# Patient Record
Sex: FEMALE | Race: WHITE | NOT HISPANIC OR LATINO | Employment: FULL TIME | URBAN - METROPOLITAN AREA
[De-identification: names, ages, dates, MRNs, and addresses within clinical notes are randomized per-mention and may not be internally consistent; named-entity substitution may affect disease eponyms.]

---

## 2019-01-05 ENCOUNTER — HOSPITAL ENCOUNTER (OUTPATIENT)
Dept: URGENT CARE | Facility: CLINIC | Age: 29
Discharge: HOME | End: 2019-01-05
Attending: EMERGENCY MEDICINE

## 2019-01-14 ENCOUNTER — HOSPITAL ENCOUNTER (OUTPATIENT)
Dept: URGENT CARE | Facility: CLINIC | Age: 29
Discharge: HOME | End: 2019-01-14
Attending: PHYSICIAN ASSISTANT

## 2019-01-16 LAB
AMOXICILLIN+CLAV SUSC ISLT: <=2
AMPICILLIN SUSC ISLT: 4
AMPICILLIN+SULBAC SUSC ISLT: <=2
BACTERIA UR CULT: ABNORMAL
CEFAZOLIN SUSC ISLT: <=4
CEFEPIME SUSC ISLT: <=1
CEFTAZIDIME SUSC ISLT: <=1
CEFTRIAXONE SUSC ISLT: <=1
CEFUROXIME ORAL SUSC ISLT: 4
CEFUROXIME PARENTER SUSC ISLT: 4
CIPROFLOXACIN SUSC ISLT: <=0.25
ERTAPENEM SUSC ISLT: <=0.5
GENTAMICIN SUSC ISLT: <=1
LEVOFLOXACIN SUSC ISLT: <=0.12
NITROFURANTOIN SUSC ISLT: <=16
TETRACYCLINE SUSC ISLT: >=16
TMP SMX SUSC ISLT: <=20
TOBRAMYCIN SUSC ISLT: <=1

## 2021-11-16 ENCOUNTER — OFFICE VISIT (OUTPATIENT)
Dept: FAMILY MEDICINE CLINIC | Facility: CLINIC | Age: 31
End: 2021-11-16

## 2021-11-16 VITALS
HEART RATE: 116 BPM | TEMPERATURE: 97 F | HEIGHT: 63 IN | SYSTOLIC BLOOD PRESSURE: 122 MMHG | DIASTOLIC BLOOD PRESSURE: 76 MMHG | WEIGHT: 146 LBS | BODY MASS INDEX: 25.87 KG/M2 | OXYGEN SATURATION: 100 %

## 2021-11-16 DIAGNOSIS — Z86.59 HISTORY OF BIPOLAR DISORDER: ICD-10-CM

## 2021-11-16 DIAGNOSIS — F41.9 ANXIETY: Primary | ICD-10-CM

## 2021-11-16 DIAGNOSIS — Z86.59 HISTORY OF OBSESSIVE COMPULSIVE DISORDER: ICD-10-CM

## 2021-11-16 LAB
25(OH)D3 SERPL-MCNC: 34.2 NG/ML
ALBUMIN SERPL-MCNC: 4.3 G/DL (ref 3.5–5.2)
ALBUMIN/GLOB SERPL: 1.5 G/DL
ALP SERPL-CCNC: 71 U/L (ref 39–117)
ALT SERPL W P-5'-P-CCNC: 19 U/L (ref 1–33)
ANION GAP SERPL CALCULATED.3IONS-SCNC: 7.5 MMOL/L (ref 5–15)
AST SERPL-CCNC: 19 U/L (ref 1–32)
BASOPHILS # BLD AUTO: 0.1 10*3/MM3 (ref 0–0.2)
BASOPHILS NFR BLD AUTO: 1.1 % (ref 0–1.5)
BILIRUB SERPL-MCNC: 0.7 MG/DL (ref 0–1.2)
BUN SERPL-MCNC: 17 MG/DL (ref 6–20)
BUN/CREAT SERPL: 22.4 (ref 7–25)
CALCIUM SPEC-SCNC: 8.3 MG/DL (ref 8.6–10.5)
CHLORIDE SERPL-SCNC: 106 MMOL/L (ref 98–107)
CO2 SERPL-SCNC: 27.5 MMOL/L (ref 22–29)
CREAT SERPL-MCNC: 0.76 MG/DL (ref 0.57–1)
DEPRECATED RDW RBC AUTO: 48.3 FL (ref 37–54)
EOSINOPHIL # BLD AUTO: 0.07 10*3/MM3 (ref 0–0.4)
EOSINOPHIL NFR BLD AUTO: 0.8 % (ref 0.3–6.2)
ERYTHROCYTE [DISTWIDTH] IN BLOOD BY AUTOMATED COUNT: 13 % (ref 12.3–15.4)
FERRITIN SERPL-MCNC: 75.4 NG/ML (ref 13–150)
FOLATE SERPL-MCNC: 12.8 NG/ML (ref 4.78–24.2)
GFR SERPL CREATININE-BSD FRML MDRD: 90 ML/MIN/1.73
GLOBULIN UR ELPH-MCNC: 2.8 GM/DL
GLUCOSE SERPL-MCNC: 79 MG/DL (ref 65–99)
HCT VFR BLD AUTO: 43.9 % (ref 34–46.6)
HGB BLD-MCNC: 14.1 G/DL (ref 12–15.9)
IMM GRANULOCYTES # BLD AUTO: 0.13 10*3/MM3 (ref 0–0.05)
IMM GRANULOCYTES NFR BLD AUTO: 1.5 % (ref 0–0.5)
IRON 24H UR-MRATE: 255 MCG/DL (ref 37–145)
IRON SATN MFR SERPL: 55 % (ref 20–50)
LYMPHOCYTES # BLD AUTO: 1.86 10*3/MM3 (ref 0.7–3.1)
LYMPHOCYTES NFR BLD AUTO: 20.9 % (ref 19.6–45.3)
MCH RBC QN AUTO: 32 PG (ref 26.6–33)
MCHC RBC AUTO-ENTMCNC: 32.1 G/DL (ref 31.5–35.7)
MCV RBC AUTO: 99.8 FL (ref 79–97)
MONOCYTES # BLD AUTO: 0.65 10*3/MM3 (ref 0.1–0.9)
MONOCYTES NFR BLD AUTO: 7.3 % (ref 5–12)
NEUTROPHILS NFR BLD AUTO: 6.09 10*3/MM3 (ref 1.7–7)
NEUTROPHILS NFR BLD AUTO: 68.4 % (ref 42.7–76)
NRBC BLD AUTO-RTO: 0 /100 WBC (ref 0–0.2)
PLATELET # BLD AUTO: 308 10*3/MM3 (ref 140–450)
PMV BLD AUTO: 11.5 FL (ref 6–12)
POTASSIUM SERPL-SCNC: 4.7 MMOL/L (ref 3.5–5.2)
PROT SERPL-MCNC: 7.1 G/DL (ref 6–8.5)
RBC # BLD AUTO: 4.4 10*6/MM3 (ref 3.77–5.28)
SODIUM SERPL-SCNC: 141 MMOL/L (ref 136–145)
T4 FREE SERPL-MCNC: 1.21 NG/DL (ref 0.93–1.7)
TIBC SERPL-MCNC: 468 MCG/DL (ref 298–536)
TRANSFERRIN SERPL-MCNC: 314 MG/DL (ref 200–360)
TSH SERPL DL<=0.05 MIU/L-ACNC: 0.46 UIU/ML (ref 0.27–4.2)
VIT B12 BLD-MCNC: 341 PG/ML (ref 211–946)
WBC # BLD AUTO: 8.9 10*3/MM3 (ref 3.4–10.8)

## 2021-11-16 PROCEDURE — 84439 ASSAY OF FREE THYROXINE: CPT | Performed by: NURSE PRACTITIONER

## 2021-11-16 PROCEDURE — 82728 ASSAY OF FERRITIN: CPT | Performed by: NURSE PRACTITIONER

## 2021-11-16 PROCEDURE — 99203 OFFICE O/P NEW LOW 30 MIN: CPT | Performed by: NURSE PRACTITIONER

## 2021-11-16 PROCEDURE — 83540 ASSAY OF IRON: CPT | Performed by: NURSE PRACTITIONER

## 2021-11-16 PROCEDURE — 82306 VITAMIN D 25 HYDROXY: CPT | Performed by: NURSE PRACTITIONER

## 2021-11-16 PROCEDURE — 82607 VITAMIN B-12: CPT | Performed by: NURSE PRACTITIONER

## 2021-11-16 PROCEDURE — 82746 ASSAY OF FOLIC ACID SERUM: CPT | Performed by: NURSE PRACTITIONER

## 2021-11-16 PROCEDURE — 80050 GENERAL HEALTH PANEL: CPT | Performed by: NURSE PRACTITIONER

## 2021-11-16 PROCEDURE — 84466 ASSAY OF TRANSFERRIN: CPT | Performed by: NURSE PRACTITIONER

## 2021-11-16 RX ORDER — MULTIPLE VITAMINS W/ MINERALS TAB 9MG-400MCG
1 TAB ORAL DAILY
COMMUNITY
End: 2022-12-14

## 2021-11-16 RX ORDER — FAMOTIDINE 20 MG/1
TABLET, FILM COATED ORAL
COMMUNITY
Start: 2021-10-21 | End: 2023-03-23

## 2021-11-16 RX ORDER — BUSPIRONE HYDROCHLORIDE 5 MG/1
5 TABLET ORAL 2 TIMES DAILY
Qty: 60 TABLET | Refills: 0 | Status: SHIPPED | OUTPATIENT
Start: 2021-11-16 | End: 2022-05-12

## 2021-11-16 RX ORDER — HYDROXYZINE HYDROCHLORIDE 10 MG/1
10 TABLET, FILM COATED ORAL EVERY 8 HOURS PRN
Qty: 90 TABLET | Refills: 0 | Status: SHIPPED | OUTPATIENT
Start: 2021-11-16 | End: 2021-12-07

## 2021-11-16 RX ORDER — DICYCLOMINE HYDROCHLORIDE 10 MG/1
10 CAPSULE ORAL
COMMUNITY
End: 2022-05-12

## 2021-11-16 NOTE — PROGRESS NOTES
Chief Complaint  Anxiety    Subjective            Mariam Lopes presents to [facility]  History of Present Illness     She is here today with concerns for possible anxiety/depression, post-partum depression. She states that her second child was born in August - a little girl. Her oldest child, also a girl, will be 6 in December. She also has three step-children at home, 7, 10, and 13. She does not know if she had post-partum depression after her first due drug use.     She works full time at [employer], 40 hours per week. She has the five kids at home, the step kids she has had for the past two years, and now their bio mom has taken them back - and she is getting child support now. She is engaged to their father. He is very helpful at home, but he does tell her that her own struggles are that of her own making. She owns that. He doesn't yell at her, and his isn't mean to her, but he is not supportive of her mental health. They have been together for three years. She has managed her symptoms by smoking marijuana.     She has been prescribed Zoloft in the past and that put her in the mental hospital. She does have bipolar d/o and in the past she has been prescribed Lamictal and that is the only thing that really seemed to help her. She can be in a very good mood, and then something can happen, and then she feels like everything is ruined. She has ruined things with her family where she just sits there in silence or instigates things.     In regards to her drug abuse - she did go to rehab - went April 2017 to June 2018 and she has been clean since then - with the exception of marijuana and alcohol on occasion. She used pills - meth, heroine, anything pills or smoking - but not injecting.     In regards to past treatment, she has been prescribed Celexa, Buspar, hydroxyzine, Tinex, at one point.     Denies any HI/SI. No AVH. No thoughts of harming her baby.     PHQ-9 Total Score:  "14    History reviewed. No pertinent past medical history.    Allergies   Allergen Reactions   • Erythromycin Rash   • Sulfa Antibiotics Rash        Past Surgical History:   Procedure Laterality Date   • TEAR DUCT SURGERY          Social History     Tobacco Use   • Smoking status: Current Every Day Smoker     Packs/day: 0.50     Start date: 2005   • Smokeless tobacco: Never Used   Substance Use Topics   • Alcohol use: Not on file   • Drug use: Not on file       Family History   Problem Relation Age of Onset   • Mental illness Mother    • Hypertension Father    • Heart attack Paternal Uncle    • Heart attack Paternal Grandfather         Health Maintenance Due   Topic Date Due   • ANNUAL PHYSICAL  Never done   • Pneumococcal Vaccine 0-64 (1 of 2 - PPSV23) Never done   • COVID-19 Vaccine (1) Never done   • INFLUENZA VACCINE  Never done   • HEPATITIS C SCREENING  Never done   • PAP SMEAR  Never done        Current Outpatient Medications on File Prior to Visit   Medication Sig   • dicyclomine (BENTYL) 10 MG capsule Take 10 mg by mouth 4 (Four) Times a Day Before Meals & at Bedtime.   • famotidine (PEPCID) 20 MG tablet    • multivitamin with minerals tablet tablet Take 1 tablet by mouth Daily.   • norelgestromin-ethinyl estradiol (ORTHO EVRA) 150-35 MCG/24HR Place 1 patch on the skin as directed by provider.     No current facility-administered medications on file prior to visit.         There is no immunization history on file for this patient.    Review of Systems     Objective     /76   Pulse 116   Temp 97 °F (36.1 °C)   Ht 160 cm (63\")   Wt 66.2 kg (146 lb)   SpO2 100%   BMI 25.86 kg/m²       Physical Exam  Vitals reviewed.   Constitutional:       General: She is not in acute distress.     Appearance: Normal appearance. She is well-developed.   HENT:      Head: Normocephalic and atraumatic.      Right Ear: Tympanic membrane, ear canal and external ear normal. There is no impacted cerumen.      Left Ear: " Tympanic membrane, ear canal and external ear normal. There is no impacted cerumen.      Nose: Nose normal.      Mouth/Throat:      Mouth: Mucous membranes are moist.      Pharynx: Oropharynx is clear. No oropharyngeal exudate or posterior oropharyngeal erythema.   Eyes:      General: No scleral icterus.     Conjunctiva/sclera: Conjunctivae normal.   Neck:      Thyroid: No thyroid mass, thyromegaly or thyroid tenderness.      Trachea: Trachea normal.   Cardiovascular:      Rate and Rhythm: Normal rate and regular rhythm.      Pulses: Normal pulses.      Heart sounds: No murmur heard.      Pulmonary:      Effort: Pulmonary effort is normal.      Breath sounds: Normal breath sounds. No wheezing or rhonchi.   Musculoskeletal:         General: Normal range of motion.      Cervical back: Normal range of motion and neck supple.      Right lower leg: No edema.      Left lower leg: No edema.   Lymphadenopathy:      Cervical: No cervical adenopathy.   Skin:     General: Skin is warm and dry.   Neurological:      Mental Status: She is alert and oriented to person, place, and time.   Psychiatric:         Mood and Affect: Mood and affect normal.         Behavior: Behavior normal.         Thought Content: Thought content normal.         Judgment: Judgment normal.         Result Review :                       Assessment and Plan      Diagnoses and all orders for this visit:    1. Anxiety (Primary)  -     Ambulatory Referral to Psychiatry  -     CBC Auto Differential  -     Comprehensive Metabolic Panel  -     TSH+Free T4  -     Iron Profile  -     Ferritin  -     Vitamin B12 & Folate  -     Vitamin D 25 Hydroxy  -     busPIRone (BUSPAR) 5 MG tablet; Take 1 tablet by mouth 2 (Two) Times a Day.  Dispense: 60 tablet; Refill: 0  -     hydrOXYzine (ATARAX) 10 MG tablet; Take 1 tablet by mouth Every 8 (Eight) Hours As Needed for Anxiety.  Dispense: 90 tablet; Refill: 0    2. Postpartum depression  -     Ambulatory Referral to  Psychiatry  -     CBC Auto Differential  -     Comprehensive Metabolic Panel  -     TSH+Free T4  -     Iron Profile  -     Ferritin  -     Vitamin B12 & Folate  -     Vitamin D 25 Hydroxy    3. History of bipolar disorder  -     Ambulatory Referral to Psychiatry    4. History of obsessive compulsive disorder  -     Ambulatory Referral to Psychiatry            Follow Up     Return in about 3 weeks (around 12/7/2021) for recheck, sooner if needed.    Patient was given instructions and counseling regarding her condition or for health maintenance advice. Please see specific information pulled into the AVS if appropriate.

## 2021-11-16 NOTE — PROGRESS NOTES
Venipuncture Blood Specimen Collection  Venipuncture performed in left arm  by Tatiana Arauz with good hemostasis. Patient tolerated the procedure well without complications.   11/16/21   Tatiana Arauz

## 2021-11-19 ENCOUNTER — PATIENT ROUNDING (BHMG ONLY) (OUTPATIENT)
Dept: FAMILY MEDICINE CLINIC | Facility: CLINIC | Age: 31
End: 2021-11-19

## 2021-11-19 NOTE — PROGRESS NOTES
November 19, 2021    Hello, may I speak with Mariam Lopes?    My name is Dariela Rodrigez      I am  with [facility]  [address] [place] [address] [address] [place]NG [id number] [phone].    Before we get started may I verify your date of birth? 1990    I am calling to officially welcome you to our practice and ask about your recent visit. Is this a good time to talk? yes    Tell me about your visit with us. What things went well?  everything went smoothly       We're always looking for ways to make our patients' experiences even better. Do you have recommendations on ways we may improve?  no    Overall were you satisfied with your first visit to our practice? yes       I appreciate you taking the time to speak with me today. Is there anything else I can do for you? no      Thank you, and have a great day.

## 2021-12-02 NOTE — PROGRESS NOTES
"    Chief Complaint:  Agitation    History of Present Illness: Mariam Lopes is a 31 y.o. female who presents to the office today referred by PCP Devorah ARCHIBALD.  Patient complains of worsening agitation and anxiety after having her daughter who is now 3 months old.  Patient reports having many life changes and increase in stress.  Her stepchildren are no longer living with her and she recently switched from third shift to first shift at work.  Patient will typically sleep about 6 to 7 hours a night, although states she will wake up throughout the night to check on her baby to make sure she is breathing.  Patient reports being diagnosed with bipolar disorder in the past.  Patient states her last manic episode was about 3 weeks after having her daughter and experienced increased agitation, crying due to feeling overwhelmed with her kitchen being unorganized.  Patient states she has constant flight of ideas.  She has been smoking marijuana to treat her symptoms and states it will slow her mind down.  Patient denies having any current SI and HI.  She has had intrusive suicidal thoughts such as having a car crash while driving.  Patient states this is distressing to her and that she does not actually want to hurt her self and then she feels guilty.  Patient denies having any plan.  No access to firearms.  She denies history of suicide attempt.  History of self-harm with cutting her arms and thighs, which she lasted in the seventh/eighth grade.  Patient does admit to hitting herself out of frustration at times.  She will have increased anxiety with needing to constantly clean and organized and have things done correctly.  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  Patient reports increased anxiety is due to worrying if a  would plant drugs on " her and then her losing her children and everything else.  Patient denies AVH. Pt denies having any thoughts of wanting to harm her baby in any way. She denies any possibility of currently being pregnant and is on birth control patches. Pt is not currently breastfeeding.    Medical Record Review: Reviewed office visit ntoe from 11/16/21, pt seen for anxiety/depression, postpartum depression. She has managed her symptoms by smoking marijuana. S/p Zoloft in the past and that put her in the mental hospital. She does have bipolar disorder and in the past she has been prescribed Lamictal and that is the only thing that really seemed to help her.  S/p celexa, buspar, hydroxyzine. PHQ-9 score of 14 at visit. Pt denies having any thoughts of harming her baby.     Reviewed recent lab results from 11/16/21, CBC WNL (except MCV 99.8, immature grans 1.5%, abs immature grans 0.13), CMP WNL (except calcium 8.3), TSH and FT4 WNL      PHQ-9 Depression Screening  Little interest or pleasure in doing things? 1   Feeling down, depressed, or hopeless? 2   Trouble falling or staying asleep, or sleeping too much? 3   Feeling tired or having little energy? 3   Poor appetite or overeating? 0   Feeling bad about yourself - or that you are a failure or have let yourself or your family down? 3   Trouble concentrating on things, such as reading the newspaper or watching television? 3   Moving or speaking so slowly that other people could have noticed? Or the opposite - being so fidgety or restless that you have been moving around a lot more than usual? 1   Thoughts that you would be better off dead, or of hurting yourself in some way? 1   PHQ-9 Total Score 17   If you checked off any problems, how difficult have these problems made it for you to do your work, take care of things at home, or get along with other people? Very difficult       OMAIRA-7  Feeling nervous, anxious or on edge: Nearly every day  Not being able to stop or control worrying:  Nearly every day  Worrying too much about different things: Nearly every day  Trouble Relaxing: Nearly every day  Being so restless that it is hard to sit still: Not at all  Feeling afraid as if something awful might happen: Nearly every day  Becoming easily annoyed or irritable: Nearly every day  OMAIRA 7 Total Score: 18  If you checked any problems, how difficult have these problems made it for you to do your work, take care of things at home, or get along with other people: Very difficult      ROS:  Review of Systems   Constitutional: Positive for unexpected weight change. Negative for appetite change, diaphoresis and fatigue.   HENT: Negative for drooling, tinnitus and trouble swallowing.    Eyes: Negative for visual disturbance.   Respiratory: Negative for cough, chest tightness and shortness of breath.    Cardiovascular: Negative for chest pain and palpitations.   Gastrointestinal: Positive for diarrhea. Negative for abdominal pain, constipation, nausea and vomiting.   Endocrine: Negative for cold intolerance and heat intolerance.   Genitourinary: Negative for difficulty urinating.   Musculoskeletal: Negative for arthralgias and myalgias.   Skin: Negative for rash.   Allergic/Immunologic: Negative for immunocompromised state.   Neurological: Positive for headaches. Negative for dizziness, tremors and seizures.   Psychiatric/Behavioral: Positive for agitation, dysphoric mood and suicidal ideas. Negative for hallucinations, self-injury and sleep disturbance. The patient is nervous/anxious.        Problem List:  Patient Active Problem List   Diagnosis   • Cervical cancer screening   • Complication of pregnancy, childbirth and puerperium   • Gastroesophageal reflux disease   • Nausea   • Normal pregnancy in multigravida   • RhD negative   • Type A blood, Rh negative   • Underimmunization status       Current Medications:   Current Outpatient Medications   Medication Sig Dispense Refill   • dicyclomine (BENTYL) 10 MG  "capsule Take 10 mg by mouth 4 (Four) Times a Day Before Meals & at Bedtime.     • famotidine (PEPCID) 20 MG tablet      • multivitamin with minerals tablet tablet Take 1 tablet by mouth Daily.     • norelgestromin-ethinyl estradiol (ORTHO EVRA) 150-35 MCG/24HR Place 1 patch on the skin as directed by provider.     • busPIRone (BUSPAR) 5 MG tablet Take 1 tablet by mouth 2 (Two) Times a Day. 60 tablet 0   • hydrOXYzine (ATARAX) 25 MG tablet Take 1 tablet by mouth 3 (Three) Times a Day As Needed for Anxiety for up to 30 days. 90 tablet 1   • lamoTRIgine (LaMICtal) 25 MG tablet Take 1 tab PO QD x 2 weeks, if tolerated can take 2 tab PO QD 46 tablet 1     No current facility-administered medications for this visit.       Discontinued Medications:  Medications Discontinued During This Encounter   Medication Reason   • hydrOXYzine (ATARAX) 10 MG tablet        Allergy:   Allergies   Allergen Reactions   • Erythromycin Rash   • Sulfa Antibiotics Rash        Past Medical History:  Past Medical History:   Diagnosis Date   • Anxiety    • Depression    • Obsessive-compulsive disorder    • Panic disorder    • Self-injurious behavior    • Substance abuse (HCC)    • Withdrawal symptoms, drug or narcotic (HCC)        Past Surgical History:  Past Surgical History:   Procedure Laterality Date   • COLONOSCOPY     • D & C AND LAPAROSCOPY     • ENDOSCOPY     • TEAR DUCT SURGERY     • VAGINAL BIRTH AFTER  SECTION         Past Psychiatric History:  Began Treatment: Patient has been in therapy since seventh grade due to outbursts.   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   Therapist: Pt did therapy from the age in 7th grade until she was 18 years old.   Admission History: Patient was admitted when she was in high school to [facility] due to SI and HI, and increased agitation after being " started on Zoloft.  Medications/Treatment: Patient has been on Zoloft (SI, HI, increased agitation), Celexa, BuSpar, Seroquel (weight gain), guanfacine. Pt reports being on Lamictal in the past and worked well with controlling her symptoms.   Self Harm: History of self-harm with cutting her arms and thighs, which she lasted in the seventh/eighth grade.  Patient does admit to hitting herself out of frustration at times.   Suicide Attempts: Denies    Family Psychiatric History:   Diagnoses: Her mother has history of bipolar disorder, anxiety, depression, OCD.  Her father has history of OCD.  Her sister has a history of anxiety, depression, OCD.  Her brother has a history of OCD.  Substance use: Her mother has a history of drug abuse, father has history of EtOH abuse.  Suicide Attempts/Completions: Her mother attempted suicide multiple times.  Patient denies any family history of suicide completions.    Family History   Problem Relation Age of Onset   • Mental illness Mother    • Anxiety disorder Mother    • Bipolar disorder Mother    • Depression Mother    • Drug abuse Mother    • OCD Mother    • Self-Injurious Behavior  Mother    • Suicide Attempts Mother    • Hypertension Father    • Alcohol abuse Father    • OCD Father    • Heart attack Paternal Uncle    • Heart attack Paternal Grandfather    • Anxiety disorder Sister    • Depression Sister    • OCD Sister    • OCD Brother    • Anxiety disorder Maternal Uncle    • Bipolar disorder Maternal Uncle    • Depression Maternal Uncle    • Drug abuse Maternal Uncle    • Anxiety disorder Maternal Grandmother    • Bipolar disorder Maternal Grandmother    • Depression Maternal Grandmother    • Drug abuse Cousin        Substance Abuse History:   Alcohol use: Occasional  Nicotine: Former  Illicit Drug Use: Patient reports history of methamphetamine use (smoking).  Last meth use was in 2017.  Patient admits to smoking marijuana.  Longest Period Sober: Patient has been sober from  methamphetamine and other drug use, except for marijuana, since 2017.  Rehab/AA/NA: Patient reports doing rehab twice.  Last rehab was for 14 months in house treatment program which she completed in 2018.    Social History:  Living Situation: Patient currently lives with her marino and 3-month-old daughter.  Her stepchildren will sometimes stay with her.  Her 5-year-old daughter will stay with her for about half of the week.  Marital/Relationship History: Patient has been with marino for 3 years.  Patient states this is a very supportive relationship and denies any kind of abuse.  Children: Patient has a 3-month-old daughter and a 5-year-old daughter.  Work History/Occupation: Patient works at [employer] and has been there for 3 years.  Education: Patient completed high school and some college.   History: Denies  Legal: Patient reports she is a convicted felon and has many arrests which have all been drug related.    Social History     Socioeconomic History   • Marital status: Single   Tobacco Use   • Smoking status: Current Every Day Smoker     Packs/day: 0.50     Start date: 2005   • Smokeless tobacco: Never Used   Vaping Use   • Vaping Use: Former   • Substances: Nicotine   Substance and Sexual Activity   • Alcohol use: Yes     Comment: occ   • Drug use: Not Currently     Types: Cocaine(coke), Methamphetamines, Heroin, Opium, Marijuana, PCP, LSD   • Sexual activity: Yes       Developmental History:   Place of birth: Patient was born in [place].  Siblings: 1 sister and 1 brother.  Childhood: Her parents have history of drug and alcohol abuse.  Otherwise she denies having any history of abuse or trauma.      Physical Exam:  Physical Exam    Appearance: Well-groomed with adequate hygiene, appears to be of stated age. Casually and neatly dressed, maintains good eye contact.   Behavior: Appropriate, cooperative. No acute distress.  Motor: No abnormal movements, tics or tremors are noted. Some psychomotor  "agitation.  Speech: Coherent and spontaneous speech. Normal reaction time to questions. Some pressured speech. Volume varying with being slightly loud at times. Increased rate and rhythm at times as well.  Mood: \"I'm okay\"  Affect: Mood lability, pt is tearful at times, followed by agitation, and appears anxious.   Thought content: Negative suicidal ideations, negative homicidal ideations. Patient denies any obsession, compulsion, or phobia. No evidence of delusions.  Perceptions: Negative auditory hallucinations, negative visual hallucinations. Pt does not appear to be actively responding to internal stimuli.   Thought process: Logical, goal-directed, coherent, and linear with no evidence of flight of ideas, looseness of associations, thought blocking. Some circumstantiality and tangentiality.   Insight/Judgement: Fair/fair  Cognition: Alert and oriented to person, place, and date. Memory intact for recent and remote events. Attention and concentration intact.     Vital Signs:   /70   Ht 157.5 cm (62\")   Wt 67.8 kg (149 lb 6.4 oz)   BMI 27.33 kg/m²      Lab Results:   Office Visit on 11/16/2021   Component Date Value Ref Range Status   • WBC 11/16/2021 8.90  3.40 - 10.80 10*3/mm3 Final   • RBC 11/16/2021 4.40  3.77 - 5.28 10*6/mm3 Final   • Hemoglobin 11/16/2021 14.1  12.0 - 15.9 g/dL Final   • Hematocrit 11/16/2021 43.9  34.0 - 46.6 % Final   • MCV 11/16/2021 99.8* 79.0 - 97.0 fL Final   • MCH 11/16/2021 32.0  26.6 - 33.0 pg Final   • MCHC 11/16/2021 32.1  31.5 - 35.7 g/dL Final   • RDW 11/16/2021 13.0  12.3 - 15.4 % Final   • RDW-SD 11/16/2021 48.3  37.0 - 54.0 fl Final   • MPV 11/16/2021 11.5  6.0 - 12.0 fL Final   • Platelets 11/16/2021 308  140 - 450 10*3/mm3 Final   • Neutrophil % 11/16/2021 68.4  42.7 - 76.0 % Final   • Lymphocyte % 11/16/2021 20.9  19.6 - 45.3 % Final   • Monocyte % 11/16/2021 7.3  5.0 - 12.0 % Final   • Eosinophil % 11/16/2021 0.8  0.3 - 6.2 % Final   • Basophil % 11/16/2021 1.1  " 0.0 - 1.5 % Final   • Immature Grans % 11/16/2021 1.5* 0.0 - 0.5 % Final   • Neutrophils, Absolute 11/16/2021 6.09  1.70 - 7.00 10*3/mm3 Final   • Lymphocytes, Absolute 11/16/2021 1.86  0.70 - 3.10 10*3/mm3 Final   • Monocytes, Absolute 11/16/2021 0.65  0.10 - 0.90 10*3/mm3 Final   • Eosinophils, Absolute 11/16/2021 0.07  0.00 - 0.40 10*3/mm3 Final   • Basophils, Absolute 11/16/2021 0.10  0.00 - 0.20 10*3/mm3 Final   • Immature Grans, Absolute 11/16/2021 0.13* 0.00 - 0.05 10*3/mm3 Final   • nRBC 11/16/2021 0.0  0.0 - 0.2 /100 WBC Final   • Glucose 11/16/2021 79  65 - 99 mg/dL Final   • BUN 11/16/2021 17  6 - 20 mg/dL Final   • Creatinine 11/16/2021 0.76  0.57 - 1.00 mg/dL Final   • Sodium 11/16/2021 141  136 - 145 mmol/L Final   • Potassium 11/16/2021 4.7  3.5 - 5.2 mmol/L Final   • Chloride 11/16/2021 106  98 - 107 mmol/L Final   • CO2 11/16/2021 27.5  22.0 - 29.0 mmol/L Final   • Calcium 11/16/2021 8.3* 8.6 - 10.5 mg/dL Final   • Total Protein 11/16/2021 7.1  6.0 - 8.5 g/dL Final   • Albumin 11/16/2021 4.30  3.50 - 5.20 g/dL Final   • ALT (SGPT) 11/16/2021 19  1 - 33 U/L Final   • AST (SGOT) 11/16/2021 19  1 - 32 U/L Final   • Alkaline Phosphatase 11/16/2021 71  39 - 117 U/L Final   • Total Bilirubin 11/16/2021 0.7  0.0 - 1.2 mg/dL Final   • eGFR Non African Amer 11/16/2021 90  >60 mL/min/1.73 Final   • Globulin 11/16/2021 2.8  gm/dL Final   • A/G Ratio 11/16/2021 1.5  g/dL Final   • BUN/Creatinine Ratio 11/16/2021 22.4  7.0 - 25.0 Final   • Anion Gap 11/16/2021 7.5  5.0 - 15.0 mmol/L Final   • TSH 11/16/2021 0.459  0.270 - 4.200 uIU/mL Final   • Free T4 11/16/2021 1.21  0.93 - 1.70 ng/dL Final    T4 results may be falsely increased if patient taking Biotin.   • Iron 11/16/2021 255* 37 - 145 mcg/dL Final   • Iron Saturation 11/16/2021 55* 20 - 50 % Final   • Transferrin 11/16/2021 314  200 - 360 mg/dL Final   • TIBC 11/16/2021 468  298 - 536 mcg/dL Final   • Ferritin 11/16/2021 75.40  13.00 - 150.00 ng/mL Final    • Folate 11/16/2021 12.80  4.78 - 24.20 ng/mL Final   • Vitamin B-12 11/16/2021 341  211 - 946 pg/mL Final   • 25 Hydroxy, Vitamin D 11/16/2021 34.2  ng/ml Final       EKG Results:  No orders to display       Imaging Results:  No Images in the past 120 days found..      Assessment/Plan   Diagnoses and all orders for this visit:    1. Bipolar affective disorder, remission status unspecified (HCC) (Primary)  -     lamoTRIgine (LaMICtal) 25 MG tablet; Take 1 tab PO QD x 2 weeks, if tolerated can take 2 tab PO QD  Dispense: 46 tablet; Refill: 1  -     Ambulatory Referral to Psychotherapy    2. Anxiety  -     lamoTRIgine (LaMICtal) 25 MG tablet; Take 1 tab PO QD x 2 weeks, if tolerated can take 2 tab PO QD  Dispense: 46 tablet; Refill: 1  -     hydrOXYzine (ATARAX) 25 MG tablet; Take 1 tablet by mouth 3 (Three) Times a Day As Needed for Anxiety for up to 30 days.  Dispense: 90 tablet; Refill: 1  -     Ambulatory Referral to Psychotherapy    Presentation seems most consistent with bipolar disorder and anxiety.  Consider OCD.  We will start the patient on Lamictal for bipolar disorder, agitation, overall mood, and anxiety.  Patient has been taking hydroxyzine 10 mg as needed, which has been about once a day.  Due to agitation and severity of anxiety at today's office visit discussed increasing hydroxyzine to 25 mg.  Patient states she thinks this would be helpful.  Will increase hydroxyzine to 25 mg 3 times daily as needed for anxiety.  Follow-up in 4 weeks.  Will email list of local counseling services.  Patient is interested in possible couples counseling as well.  Addressed all questions and concerns. Pt is not currently breastfeeding and is not pregnant.    Visit Diagnoses:    ICD-10-CM ICD-9-CM   1. Bipolar affective disorder, remission status unspecified (HCC)  F31.9 296.80   2. Anxiety  F41.9 300.00       PLAN:  1. Safety: No acute safety concerns at this time.  2. Therapy: Will refer for psychotherapy.  3. Risk  Assessment: Risk of self-harm acutely is moderate.  Risk factors include anxiety disorder, mood disorder, family history of suicide attempts, history of self harm in the past, and recent psychosocial stressors (pandemic). Protective factors include denies access to guns/weapons, no present SI, no history of suicide attempts in the past, minimal AODA, healthcare seeking, future orientation, willingness to engage in care.  Risk of self-harm chronically is also moderate, but could be further elevated in the event of treatment noncompliance and/or AODA.  4. Labs/Diagnostics Ordered:   Orders Placed This Encounter   Procedures   • Ambulatory Referral to Psychotherapy     5. Medications:   New Medications Ordered This Visit   Medications   • lamoTRIgine (LaMICtal) 25 MG tablet     Sig: Take 1 tab PO QD x 2 weeks, if tolerated can take 2 tab PO QD     Dispense:  46 tablet     Refill:  1   • hydrOXYzine (ATARAX) 25 MG tablet     Sig: Take 1 tablet by mouth 3 (Three) Times a Day As Needed for Anxiety for up to 30 days.     Dispense:  90 tablet     Refill:  1     Discussed all risks, benefits, alternatives, and side effects of Lamictal, including but not limited to rash, rebound depressive or manic symptoms if prompt discontinuation, GI upset, agitation, and sedation. Pt instructed to avoid driving and doing other tasks or actions that require to be alert until knowing how the drug affects them. Pt informed that birth control pills and other hormone-based birth control may not work as well to prevent pregnancy and advised to use some other kind of birth control, such as condoms, while taking this med. Discussed the need for pt to immediately call the office for any new or worsening symptoms, such as rash, worsening depression; feeling nervous or restless; suicidal thoughts or actions; or other changes changes in mood or behavior, and all other concerns. Pt educated on med compliance and the risks of suddenly stopping this  medication or missing doses. Pt verbalized understanding and is agreeable to taking Lamictal. Addressed all questions and concerns.     Discussed all risks, benefits, alternatives, and side effects of Hydroxyzine including but not limited to dry mouth, sedation, tremor, respiratory depression, acute generalized exanthematous pustulosis, CNS depression, drowsiness, cognitive dysfunction, dizziness, falls risk, prolonged QT interval, torsades de pointes, hallucination, involuntary body movements, seizure, and headache. Pt denies known h/o prolonged QT interval. Pt educated on the need to practice safe sex while taking this med. Discussed the need for pt to immediately call the office for any new or worsening symptoms, such as changes changes in mood or behavior, and all other concerns. Pt verbalized understanding and is agreeable to taking Hydroxyzine. Addressed all questions and concerns.     6. Follow up:   F/u in 4 weeks.     TREATMENT PLAN/GOALS: Continue supportive psychotherapy efforts and medications as indicated. Treatment and medication options discussed during today's visit. Patient ackowledged and verbally consented to continue with current treatment plan and was educated on the importance of compliance with treatment and follow-up appointments.    MEDICATION ISSUES:  WYATT reviewed as expected.  Discussed medication options and treatment plan of prescribed medication as well as the risks, benefits, and side effects including potential falls, possible impaired driving and metabolic adversities among others. Patient is agreeable to call the office with any worsening of symptoms or onset of side effects. Patient is agreeable to call 911 or go to the nearest ER should he/she begin having SI/HI. No medication side effects or related complaints today.            This document has been electronically signed by Melyssa Dang PA-C  December 7, 2021 12:58 EST      Part of this note may be an electronic  transcription/translation of spoken language to printed text using the Dragon Dictation System.

## 2021-12-07 ENCOUNTER — OFFICE VISIT (OUTPATIENT)
Dept: BEHAVIORAL HEALTH | Facility: CLINIC | Age: 31
End: 2021-12-07

## 2021-12-07 VITALS
HEIGHT: 62 IN | BODY MASS INDEX: 27.49 KG/M2 | WEIGHT: 149.4 LBS | SYSTOLIC BLOOD PRESSURE: 120 MMHG | DIASTOLIC BLOOD PRESSURE: 70 MMHG

## 2021-12-07 DIAGNOSIS — F31.9 BIPOLAR AFFECTIVE DISORDER, REMISSION STATUS UNSPECIFIED (HCC): Primary | ICD-10-CM

## 2021-12-07 DIAGNOSIS — F41.9 ANXIETY: ICD-10-CM

## 2021-12-07 PROBLEM — Z34.80 NORMAL PREGNANCY IN MULTIGRAVIDA: Status: ACTIVE | Noted: 2020-12-30

## 2021-12-07 PROBLEM — Z12.4 CERVICAL CANCER SCREENING: Status: ACTIVE | Noted: 2021-02-26

## 2021-12-07 PROBLEM — R11.0 NAUSEA: Status: ACTIVE | Noted: 2021-02-26

## 2021-12-07 PROBLEM — K21.9 GASTROESOPHAGEAL REFLUX DISEASE: Status: ACTIVE | Noted: 2021-02-26

## 2021-12-07 PROBLEM — Z67.91 RHD NEGATIVE: Status: ACTIVE | Noted: 2021-02-24

## 2021-12-07 PROBLEM — IMO0002 COMPLICATION OF PREGNANCY, CHILDBIRTH AND PUERPERIUM: Status: ACTIVE | Noted: 2021-02-24

## 2021-12-07 PROBLEM — Z28.39 UNDERIMMUNIZATION STATUS: Status: ACTIVE | Noted: 2021-02-24

## 2021-12-07 PROBLEM — Z67.11 TYPE A BLOOD, RH NEGATIVE: Status: ACTIVE | Noted: 2021-02-26

## 2021-12-07 PROCEDURE — 90792 PSYCH DIAG EVAL W/MED SRVCS: CPT | Performed by: PHYSICIAN ASSISTANT

## 2021-12-07 RX ORDER — HYDROXYZINE HYDROCHLORIDE 25 MG/1
25 TABLET, FILM COATED ORAL 3 TIMES DAILY PRN
Qty: 90 TABLET | Refills: 1 | Status: SHIPPED | OUTPATIENT
Start: 2021-12-07 | End: 2022-01-06

## 2021-12-07 RX ORDER — LAMOTRIGINE 25 MG/1
TABLET ORAL
Qty: 46 TABLET | Refills: 1 | Status: SHIPPED | OUTPATIENT
Start: 2021-12-07 | End: 2022-01-25

## 2022-01-24 NOTE — PROGRESS NOTES
Chief Complaint:  Agitation    History of Present Illness: Mariam Lopes is a 31 y.o. female who presents to the office today for follow-up of mood.  Patient reports taking medications as prescribed and tolerating them well without any complications.  She has been taking Lamictal 75 mg and states this has controlled her mood very well.  She has been taking hydroxyzine 50 mg in the morning, which she attributes to some irritability due to a negative coworker.  Patient states she feels like she is back to how she was before her baby.  She denies having any depression.  Patient denies having any SI or HI.  Her anxiety has improved and she has not had any breakdowns.  Patient denies having any increased agitation or crying due to feeling overwhelmed.  She continues to having constant flight of ideas, but states this is not anxiety ridden such as before.  She has been sleeping well and getting about 8 hours of sleep at night.  Patient reports improvement in relationship with her  as they have improved in communication, been spending more time together, and have been more intimate.  Patient denies having any intrusive suicidal thoughts such as mention before.  She states this has completely resolved.  She denies hitting herself out of frustration since last office visit as well.  She does continue to constantly clean and organized and have things done correctly, but states she does not think that this will change.  She denies having increased anxiety with driving a car.  Patient states she does not feel like she is obsessive or compelled to do things such as before needing to wash her daughter every single night or needing to check on her daughter with breathing at night as well.  Patient does note having an episode of some irritability, but is less severe compared to before.  No new or worsening symptoms.  Patient states she has been cooking again and been picking up around the house.      Medical Record  Review: Reviewed office visit ntoe from 11/16/21, pt seen for anxiety/depression, postpartum depression. She has managed her symptoms by smoking marijuana. S/p Zoloft in the past and that put her in the mental hospital. She does have bipolar disorder and in the past she has been prescribed Lamictal and that is the only thing that really seemed to help her.  S/p celexa, buspar, hydroxyzine. PHQ-9 score of 14 at visit. Pt denies having any thoughts of harming her baby.     Reviewed recent lab results from 11/16/21, CBC WNL (except MCV 99.8, immature grans 1.5%, abs immature grans 0.13), CMP WNL (except calcium 8.3), TSH and FT4 WNL      ROS:  Review of Systems   Constitutional: Negative for appetite change, diaphoresis, fatigue and unexpected weight change.   HENT: Negative for drooling, tinnitus and trouble swallowing.    Eyes: Negative for visual disturbance.   Respiratory: Negative for cough, chest tightness and shortness of breath.    Cardiovascular: Negative for chest pain and palpitations.   Gastrointestinal: Negative for abdominal pain, constipation, diarrhea, nausea and vomiting.   Endocrine: Negative for cold intolerance and heat intolerance.   Genitourinary: Negative for difficulty urinating.   Musculoskeletal: Negative for arthralgias and myalgias.   Skin: Negative for rash.   Allergic/Immunologic: Negative for immunocompromised state.   Neurological: Negative for dizziness, tremors, seizures and headaches.   Psychiatric/Behavioral: Positive for agitation and dysphoric mood. Negative for hallucinations, self-injury, sleep disturbance and suicidal ideas. The patient is nervous/anxious.        Problem List:  Patient Active Problem List   Diagnosis   • Cervical cancer screening   • Complication of pregnancy, childbirth and puerperium   • Gastroesophageal reflux disease   • Nausea   • Normal pregnancy in multigravida   • RhD negative   • Type A blood, Rh negative   • Underimmunization status       Current  "Medications:   Current Outpatient Medications   Medication Sig Dispense Refill   • dicyclomine (BENTYL) 10 MG capsule Take 10 mg by mouth 4 (Four) Times a Day Before Meals & at Bedtime.     • famotidine (PEPCID) 20 MG tablet      • multivitamin with minerals tablet tablet Take 1 tablet by mouth Daily.     • norelgestromin-ethinyl estradiol (ORTHO EVRA) 150-35 MCG/24HR Place 1 patch on the skin as directed by provider.     • busPIRone (BUSPAR) 5 MG tablet Take 1 tablet by mouth 2 (Two) Times a Day. 60 tablet 0   • hydrOXYzine (ATARAX) 25 MG tablet Take 1 tablet by mouth 3 (Three) Times a Day As Needed for Anxiety for up to 30 days. 90 tablet 2   • lamoTRIgine (LaMICtal) 100 MG tablet Take 1 tablet by mouth Daily. 30 tablet 2     No current facility-administered medications for this visit.       Discontinued Medications:  Medications Discontinued During This Encounter   Medication Reason   • lamoTRIgine (LaMICtal) 25 MG tablet    • hydrOXYzine (ATARAX) 10 MG tablet        Allergy:   Allergies   Allergen Reactions   • Erythromycin Rash   • Sulfa Antibiotics Rash        Past Medical History:  Past Medical History:   Diagnosis Date   • Anxiety    • Depression    • Obsessive-compulsive disorder    • Panic disorder    • Self-injurious behavior    • Substance abuse (HCC)    • Withdrawal symptoms, drug or narcotic (HCC)        Past Surgical History:  Past Surgical History:   Procedure Laterality Date   • COLONOSCOPY     • D & C AND LAPAROSCOPY     • ENDOSCOPY     • TEAR DUCT SURGERY     • VAGINAL BIRTH AFTER  SECTION         Past Psychiatric History:  Began Treatment: Patient has been in therapy since seventh grade due to outbursts.   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   Therapist: Pt did therapy from the age in 7th grade until she was 18 years old.   Admission History: " Patient was admitted when she was in high school to [facility] due to SI and HI, and increased agitation after being started on Zoloft.  Medications/Treatment: Patient has been on Zoloft (SI, HI, increased agitation), Celexa, BuSpar, Seroquel (weight gain), guanfacine. Pt reports being on Lamictal in the past and worked well with controlling her symptoms.   Self Harm: History of self-harm with cutting her arms and thighs, which she lasted in the seventh/eighth grade.  Patient does admit to hitting herself out of frustration at times.   Suicide Attempts: Denies    Family Psychiatric History:   Diagnoses: Her mother has history of bipolar disorder, anxiety, depression, OCD.  Her father has history of OCD.  Her sister has a history of anxiety, depression, OCD.  Her brother has a history of OCD.  Substance use: Her mother has a history of drug abuse, father has history of EtOH abuse.  Suicide Attempts/Completions: Her mother attempted suicide multiple times.  Patient denies any family history of suicide completions.    Family History   Problem Relation Age of Onset   • Mental illness Mother    • Anxiety disorder Mother    • Bipolar disorder Mother    • Depression Mother    • Drug abuse Mother    • OCD Mother    • Self-Injurious Behavior  Mother    • Suicide Attempts Mother    • Hypertension Father    • Alcohol abuse Father    • OCD Father    • Heart attack Paternal Uncle    • Heart attack Paternal Grandfather    • Anxiety disorder Sister    • Depression Sister    • OCD Sister    • OCD Brother    • Anxiety disorder Maternal Uncle    • Bipolar disorder Maternal Uncle    • Depression Maternal Uncle    • Drug abuse Maternal Uncle    • Anxiety disorder Maternal Grandmother    • Bipolar disorder Maternal Grandmother    • Depression Maternal Grandmother    • Drug abuse Cousin        Substance Abuse History:   Alcohol use: Occasional  Nicotine: Former  Illicit Drug Use: Patient reports history of methamphetamine use  (smoking).  Last meth use was in 2017.  Patient admits to smoking marijuana.  Longest Period Sober: Patient has been sober from methamphetamine and other drug use, except for marijuana, since 2017.  Rehab/AA/NA: Patient reports doing rehab twice.  Last rehab was for 14 months in house treatment program which she completed in 2018.    Social History:  Living Situation: Patient currently lives with her fiancé and 3-month-old daughter.  Her stepchildren will sometimes stay with her.  Her 5-year-old daughter will stay with her for about half of the week.  Marital/Relationship History: Patient has been with marino for 3 years.  Patient states this is a very supportive relationship and denies any kind of abuse.  Children: Patient has a 3-month-old daughter and a 5-year-old daughter.  Work History/Occupation: Patient works at [employer] and has been there for 3 years.  Education: Patient completed high school and some college.   History: Denies  Legal: Patient reports she is a convicted felon and has many arrests which have all been drug related.    Social History     Socioeconomic History   • Marital status: Single   Tobacco Use   • Smoking status: Current Every Day Smoker     Packs/day: 0.50     Start date: 2005   • Smokeless tobacco: Never Used   Vaping Use   • Vaping Use: Former   • Substances: Nicotine   Substance and Sexual Activity   • Alcohol use: Yes     Comment: occ   • Drug use: Not Currently     Types: Cocaine(coke), Methamphetamines, Heroin, Opium, Marijuana, PCP, LSD   • Sexual activity: Yes       Developmental History:   Place of birth: Patient was born in [place].  Siblings: 1 sister and 1 brother.  Childhood: Her parents have history of drug and alcohol abuse.  Otherwise she denies having any history of abuse or trauma.      Physical Exam:  Physical Exam    Appearance: Well-groomed with adequate hygiene, appears to be of stated age. Casually and neatly dressed, maintains good eye contact.  "  Behavior: Appropriate, cooperative. No acute distress.  Motor: No abnormal movements, tics or tremors are noted. Some psychomotor agitation.  Speech: Coherent and spontaneous speech. Normal reaction time to questions. Some pressured speech. Volume varying with being slightly loud at times. Increased rate and rhythm at times as well.  Mood: \"I'm good\"  Affect: Full range, appropriate, congruent with spontaneous emotional reactivity. Normal intensity. No emotional blunting. Pt does not appear depressed or anxious.  Thought content: Negative suicidal ideations, negative homicidal ideations. Patient denies any obsession, compulsion, or phobia. No evidence of delusions.  Perceptions: Negative auditory hallucinations, negative visual hallucinations. Pt does not appear to be actively responding to internal stimuli.   Thought process: Logical, goal-directed, coherent, and linear with no evidence of flight of ideas, looseness of associations, thought blocking. Some circumstantiality and tangentiality.   Insight/Judgement: Fair/fair  Cognition: Alert and oriented to person, place, and date. Memory intact for recent and remote events. Attention and concentration intact.     Vital Signs:   /70   Ht 160 cm (63\")   Wt 69 kg (152 lb 3.2 oz)   BMI 26.96 kg/m²      Lab Results:   Office Visit on 11/16/2021   Component Date Value Ref Range Status   • WBC 11/16/2021 8.90  3.40 - 10.80 10*3/mm3 Final   • RBC 11/16/2021 4.40  3.77 - 5.28 10*6/mm3 Final   • Hemoglobin 11/16/2021 14.1  12.0 - 15.9 g/dL Final   • Hematocrit 11/16/2021 43.9  34.0 - 46.6 % Final   • MCV 11/16/2021 99.8* 79.0 - 97.0 fL Final   • MCH 11/16/2021 32.0  26.6 - 33.0 pg Final   • MCHC 11/16/2021 32.1  31.5 - 35.7 g/dL Final   • RDW 11/16/2021 13.0  12.3 - 15.4 % Final   • RDW-SD 11/16/2021 48.3  37.0 - 54.0 fl Final   • MPV 11/16/2021 11.5  6.0 - 12.0 fL Final   • Platelets 11/16/2021 308  140 - 450 10*3/mm3 Final   • Neutrophil % 11/16/2021 68.4  42.7 - " 76.0 % Final   • Lymphocyte % 11/16/2021 20.9  19.6 - 45.3 % Final   • Monocyte % 11/16/2021 7.3  5.0 - 12.0 % Final   • Eosinophil % 11/16/2021 0.8  0.3 - 6.2 % Final   • Basophil % 11/16/2021 1.1  0.0 - 1.5 % Final   • Immature Grans % 11/16/2021 1.5* 0.0 - 0.5 % Final   • Neutrophils, Absolute 11/16/2021 6.09  1.70 - 7.00 10*3/mm3 Final   • Lymphocytes, Absolute 11/16/2021 1.86  0.70 - 3.10 10*3/mm3 Final   • Monocytes, Absolute 11/16/2021 0.65  0.10 - 0.90 10*3/mm3 Final   • Eosinophils, Absolute 11/16/2021 0.07  0.00 - 0.40 10*3/mm3 Final   • Basophils, Absolute 11/16/2021 0.10  0.00 - 0.20 10*3/mm3 Final   • Immature Grans, Absolute 11/16/2021 0.13* 0.00 - 0.05 10*3/mm3 Final   • nRBC 11/16/2021 0.0  0.0 - 0.2 /100 WBC Final   • Glucose 11/16/2021 79  65 - 99 mg/dL Final   • BUN 11/16/2021 17  6 - 20 mg/dL Final   • Creatinine 11/16/2021 0.76  0.57 - 1.00 mg/dL Final   • Sodium 11/16/2021 141  136 - 145 mmol/L Final   • Potassium 11/16/2021 4.7  3.5 - 5.2 mmol/L Final   • Chloride 11/16/2021 106  98 - 107 mmol/L Final   • CO2 11/16/2021 27.5  22.0 - 29.0 mmol/L Final   • Calcium 11/16/2021 8.3* 8.6 - 10.5 mg/dL Final   • Total Protein 11/16/2021 7.1  6.0 - 8.5 g/dL Final   • Albumin 11/16/2021 4.30  3.50 - 5.20 g/dL Final   • ALT (SGPT) 11/16/2021 19  1 - 33 U/L Final   • AST (SGOT) 11/16/2021 19  1 - 32 U/L Final   • Alkaline Phosphatase 11/16/2021 71  39 - 117 U/L Final   • Total Bilirubin 11/16/2021 0.7  0.0 - 1.2 mg/dL Final   • eGFR Non African Amer 11/16/2021 90  >60 mL/min/1.73 Final   • Globulin 11/16/2021 2.8  gm/dL Final   • A/G Ratio 11/16/2021 1.5  g/dL Final   • BUN/Creatinine Ratio 11/16/2021 22.4  7.0 - 25.0 Final   • Anion Gap 11/16/2021 7.5  5.0 - 15.0 mmol/L Final   • TSH 11/16/2021 0.459  0.270 - 4.200 uIU/mL Final   • Free T4 11/16/2021 1.21  0.93 - 1.70 ng/dL Final    T4 results may be falsely increased if patient taking Biotin.   • Iron 11/16/2021 255* 37 - 145 mcg/dL Final   • Iron  Saturation 11/16/2021 55* 20 - 50 % Final   • Transferrin 11/16/2021 314  200 - 360 mg/dL Final   • TIBC 11/16/2021 468  298 - 536 mcg/dL Final   • Ferritin 11/16/2021 75.40  13.00 - 150.00 ng/mL Final   • Folate 11/16/2021 12.80  4.78 - 24.20 ng/mL Final   • Vitamin B-12 11/16/2021 341  211 - 946 pg/mL Final   • 25 Hydroxy, Vitamin D 11/16/2021 34.2  ng/ml Final       EKG Results:  No orders to display       Imaging Results:  No Images in the past 120 days found..      Assessment/Plan   Diagnoses and all orders for this visit:    1. Bipolar affective disorder, remission status unspecified (HCC) (Primary)  -     lamoTRIgine (LaMICtal) 100 MG tablet; Take 1 tablet by mouth Daily.  Dispense: 30 tablet; Refill: 2    2. Mixed obsessional thoughts and acts  -     lamoTRIgine (LaMICtal) 100 MG tablet; Take 1 tablet by mouth Daily.  Dispense: 30 tablet; Refill: 2  -     Discontinue: hydrOXYzine (ATARAX) 10 MG tablet; Take 2 tablets by mouth 2 (Two) Times a Day As Needed for Anxiety for up to 30 days.  Dispense: 120 tablet; Refill: 2  -     hydrOXYzine (ATARAX) 25 MG tablet; Take 1 tablet by mouth 3 (Three) Times a Day As Needed for Anxiety for up to 30 days.  Dispense: 90 tablet; Refill: 2    Presentation seems most consistent with bipolar disorder and anxiety, likely OCD.  Patient is doing very well on Lamictal.  Will increase Lamictal to 100 mg to target bipolar disorder, agitation, anxiety, OCD, and overall mood.  We will continue with hydroxyzine 25mg as needed for anxiety.  Follow-up in 8 weeks.  Discussed the need to contact me for any new or worsening symptoms or any other concerns.  Addressed all questions and concerns.    Visit Diagnoses:    ICD-10-CM ICD-9-CM   1. Bipolar affective disorder, remission status unspecified (HCC)  F31.9 296.80   2. Mixed obsessional thoughts and acts  F42.2 300.3       PLAN:  1. Safety: No acute safety concerns at this time.  2. Therapy: Pt declines psychotherapy referral at this  time.  3. Risk Assessment: Risk of self-harm acutely is moderate.  Risk factors include anxiety disorder, mood disorder, family history of suicide attempts, history of self harm in the past, and recent psychosocial stressors (pandemic). Protective factors include denies access to guns/weapons, no present SI, no history of suicide attempts in the past, minimal AODA, healthcare seeking, future orientation, willingness to engage in care.  Risk of self-harm chronically is also moderate, but could be further elevated in the event of treatment noncompliance and/or AODA.  4. Labs/Diagnostics Ordered:   No orders of the defined types were placed in this encounter.    5. Medications:   New Medications Ordered This Visit   Medications   • lamoTRIgine (LaMICtal) 100 MG tablet     Sig: Take 1 tablet by mouth Daily.     Dispense:  30 tablet     Refill:  2   • hydrOXYzine (ATARAX) 25 MG tablet     Sig: Take 1 tablet by mouth 3 (Three) Times a Day As Needed for Anxiety for up to 30 days.     Dispense:  90 tablet     Refill:  2     Discussed all risks, benefits, alternatives, and side effects of Lamictal, including but not limited to rash, rebound depressive or manic symptoms if prompt discontinuation, GI upset, agitation, and sedation. Pt instructed to avoid driving and doing other tasks or actions that require to be alert until knowing how the drug affects them. Pt informed that birth control pills and other hormone-based birth control may not work as well to prevent pregnancy and advised to use some other kind of birth control, such as condoms, while taking this med. Discussed the need for pt to immediately call the office for any new or worsening symptoms, such as rash, worsening depression; feeling nervous or restless; suicidal thoughts or actions; or other changes changes in mood or behavior, and all other concerns. Pt educated on med compliance and the risks of suddenly stopping this medication or missing doses. Pt  verbalized understanding and is agreeable to taking Lamictal. Addressed all questions and concerns.     Discussed all risks, benefits, alternatives, and side effects of Hydroxyzine including but not limited to dry mouth, sedation, tremor, respiratory depression, acute generalized exanthematous pustulosis, CNS depression, drowsiness, cognitive dysfunction, dizziness, falls risk, prolonged QT interval, torsades de pointes, hallucination, involuntary body movements, seizure, and headache. Pt denies known h/o prolonged QT interval. Pt educated on the need to practice safe sex while taking this med. Discussed the need for pt to immediately call the office for any new or worsening symptoms, such as changes changes in mood or behavior, and all other concerns. Pt verbalized understanding and is agreeable to taking Hydroxyzine. Addressed all questions and concerns.     6. Follow up:   F/u in 8 weeks.     TREATMENT PLAN/GOALS: Continue supportive psychotherapy efforts and medications as indicated. Treatment and medication options discussed during today's visit. Patient ackowledged and verbally consented to continue with current treatment plan and was educated on the importance of compliance with treatment and follow-up appointments.    MEDICATION ISSUES:  WYATT reviewed as expected.  Discussed medication options and treatment plan of prescribed medication as well as the risks, benefits, and side effects including potential falls, possible impaired driving and metabolic adversities among others. Patient is agreeable to call the office with any worsening of symptoms or onset of side effects. Patient is agreeable to call 911 or go to the nearest ER should he/she begin having SI/HI. No medication side effects or related complaints today.            This document has been electronically signed by Melyssa Dang PA-C  January 25, 2022 14:59 EST      Part of this note may be an electronic transcription/translation of spoken  language to printed text using the Dragon Dictation System.

## 2022-01-25 ENCOUNTER — OFFICE VISIT (OUTPATIENT)
Dept: BEHAVIORAL HEALTH | Facility: CLINIC | Age: 32
End: 2022-01-25

## 2022-01-25 VITALS
WEIGHT: 152.2 LBS | BODY MASS INDEX: 26.97 KG/M2 | DIASTOLIC BLOOD PRESSURE: 70 MMHG | SYSTOLIC BLOOD PRESSURE: 110 MMHG | HEIGHT: 63 IN

## 2022-01-25 DIAGNOSIS — F42.2 MIXED OBSESSIONAL THOUGHTS AND ACTS: ICD-10-CM

## 2022-01-25 DIAGNOSIS — F31.9 BIPOLAR AFFECTIVE DISORDER, REMISSION STATUS UNSPECIFIED: Primary | ICD-10-CM

## 2022-01-25 PROCEDURE — 99213 OFFICE O/P EST LOW 20 MIN: CPT | Performed by: PHYSICIAN ASSISTANT

## 2022-01-25 RX ORDER — HYDROXYZINE HYDROCHLORIDE 10 MG/1
20 TABLET, FILM COATED ORAL 2 TIMES DAILY PRN
Qty: 120 TABLET | Refills: 2 | Status: SHIPPED | OUTPATIENT
Start: 2022-01-25 | End: 2022-01-25

## 2022-01-25 RX ORDER — HYDROXYZINE HYDROCHLORIDE 25 MG/1
25 TABLET, FILM COATED ORAL 3 TIMES DAILY PRN
Qty: 90 TABLET | Refills: 2 | Status: SHIPPED | OUTPATIENT
Start: 2022-01-25 | End: 2022-02-24

## 2022-01-25 RX ORDER — LAMOTRIGINE 100 MG/1
100 TABLET ORAL DAILY
Qty: 30 TABLET | Refills: 2 | Status: SHIPPED | OUTPATIENT
Start: 2022-01-25 | End: 2022-04-28

## 2022-03-31 ENCOUNTER — OFFICE VISIT (OUTPATIENT)
Dept: FAMILY MEDICINE CLINIC | Facility: CLINIC | Age: 32
End: 2022-03-31

## 2022-03-31 VITALS
WEIGHT: 150.4 LBS | HEART RATE: 110 BPM | HEIGHT: 63 IN | DIASTOLIC BLOOD PRESSURE: 70 MMHG | BODY MASS INDEX: 26.65 KG/M2 | SYSTOLIC BLOOD PRESSURE: 126 MMHG | TEMPERATURE: 97.1 F | OXYGEN SATURATION: 98 %

## 2022-03-31 DIAGNOSIS — J02.9 SORE THROAT: ICD-10-CM

## 2022-03-31 DIAGNOSIS — J30.9 ALLERGIC RHINITIS, UNSPECIFIED SEASONALITY, UNSPECIFIED TRIGGER: Primary | ICD-10-CM

## 2022-03-31 PROBLEM — IMO0002 COMPLICATION OF PREGNANCY, CHILDBIRTH AND PUERPERIUM: Status: RESOLVED | Noted: 2021-02-24 | Resolved: 2022-03-31

## 2022-03-31 LAB
EXPIRATION DATE: NORMAL
INTERNAL CONTROL: NORMAL
Lab: NORMAL
S PYO AG THROAT QL: NEGATIVE

## 2022-03-31 PROCEDURE — 99213 OFFICE O/P EST LOW 20 MIN: CPT | Performed by: NURSE PRACTITIONER

## 2022-03-31 PROCEDURE — 87880 STREP A ASSAY W/OPTIC: CPT | Performed by: NURSE PRACTITIONER

## 2022-03-31 RX ORDER — HYDROXYZINE HYDROCHLORIDE 10 MG/1
TABLET, FILM COATED ORAL
COMMUNITY
Start: 2022-01-25 | End: 2022-05-12

## 2022-03-31 RX ORDER — CETIRIZINE HYDROCHLORIDE 10 MG/1
10 TABLET ORAL DAILY
Qty: 90 TABLET | Refills: 0 | Status: SHIPPED | OUTPATIENT
Start: 2022-03-31 | End: 2022-05-12

## 2022-03-31 NOTE — PROGRESS NOTES
Chief Complaint  Sore Throat (White spots on throat since yesterday.  )    PHQ-2 Total Score: 0    Subjective        Past Medical History:   Diagnosis Date   • Anxiety    • Depression    • Obsessive-compulsive disorder    • Panic disorder    • Self-injurious behavior    • Substance abuse (HCC)    • Withdrawal symptoms, drug or narcotic (HCC)      Social History     Tobacco Use   • Smoking status: Current Every Day Smoker     Packs/day: 0.50     Start date: 2005   • Smokeless tobacco: Never Used   Vaping Use   • Vaping Use: Former   • Substances: Nicotine   Substance Use Topics   • Alcohol use: Yes     Comment: occ   • Drug use: Not Currently     Types: Cocaine(coke), Methamphetamines, Heroin, Opium, Marijuana, PCP, LSD      Current Outpatient Medications on File Prior to Visit   Medication Sig   • famotidine (PEPCID) 20 MG tablet    • hydrOXYzine (ATARAX) 10 MG tablet TAKE 2 TABLETS BY MOUTH TWICE DAILY AS NEEDED FOR ANXIETY   • lamoTRIgine (LaMICtal) 100 MG tablet Take 1 tablet by mouth Daily.   • multivitamin with minerals tablet tablet Take 1 tablet by mouth Daily.   • norelgestromin-ethinyl estradiol (ORTHO EVRA) 150-35 MCG/24HR Place 1 patch on the skin as directed by provider.   • busPIRone (BUSPAR) 5 MG tablet Take 1 tablet by mouth 2 (Two) Times a Day.   • dicyclomine (BENTYL) 10 MG capsule Take 10 mg by mouth 4 (Four) Times a Day Before Meals & at Bedtime.     No current facility-administered medications on file prior to visit.      Allergies   Allergen Reactions   • Erythromycin Rash   • Sulfa Antibiotics Rash      Health Maintenance Due   Topic Date Due   • ANNUAL PHYSICAL  Never done   • COVID-19 Vaccine (1) Never done   • Pneumococcal Vaccine 0-64 (1 of 2 - PPSV23) Never done   • INFLUENZA VACCINE  Never done   • HEPATITIS C SCREENING  Never done   • PAP SMEAR  Never done      Mariam Lopes presents to [facility]  Here with sore throat and then noted white patches  "yesterday. Pt thought she had cut her throat on a chip but looked yesterday and noted a white patch.    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      Objective   Vital Signs:   /70 (BP Location: Right arm, Patient Position: Standing, Cuff Size: Adult)   Pulse 110   Temp 97.1 °F (36.2 °C) (Temporal)   Ht 160 cm (63\")   Wt 68.2 kg (150 lb 6.4 oz)   SpO2 98%   BMI 26.64 kg/m²     Review of Systems   Constitutional: Negative for chills and fever.   HENT: Positive for sore throat.    Gastrointestinal: Negative for nausea and vomiting.      Physical Exam  Vitals reviewed.   Constitutional:       General: She is not in acute distress.     Appearance: Normal appearance. She is well-developed.   HENT:      Right Ear: Tympanic membrane, ear canal and external ear normal.      Left Ear: Tympanic membrane, ear canal and external ear normal.      Mouth/Throat:      Mouth: Mucous membranes are moist.      Pharynx: Oropharyngeal exudate present. No posterior oropharyngeal erythema.     Eyes:      Conjunctiva/sclera: Conjunctivae normal.      Pupils: Pupils are equal, round, and reactive to light.   Cardiovascular:      Rate and Rhythm: Normal rate and regular rhythm.      Heart sounds: Normal heart sounds.   Pulmonary:      Effort: Pulmonary effort is normal.      Breath sounds: Normal breath sounds.   Musculoskeletal:      Cervical back: Neck supple.   Skin:     General: Skin is warm and dry.   Neurological:      Mental Status: She is alert and oriented to person, place, and time.   Psychiatric:         Mood and Affect: Mood and affect normal.         Behavior: Behavior normal.         Thought Content: Thought content normal.         Judgment: Judgment normal.        Result Review :   The following data was reviewed by: CRISTELA Morris on 03/31/2022:    POCT Strep: negative          Assessment and Plan    Diagnoses and all orders " for this visit:    1. Allergic rhinitis, unspecified seasonality, unspecified trigger (Primary)  -     cetirizine (zyrTEC) 10 MG tablet; Take 1 tablet by mouth Daily.  Dispense: 90 tablet; Refill: 0    2. Sore throat  -     POCT rapid strep A    Notify with worsening in 3-4 days.     Follow Up   Return if symptoms worsen or fail to improve.  Patient was given instructions and counseling regarding her condition or for health maintenance advice. Please see specific information pulled into the AVS if appropriate.

## 2022-04-27 DIAGNOSIS — F42.2 MIXED OBSESSIONAL THOUGHTS AND ACTS: ICD-10-CM

## 2022-04-27 DIAGNOSIS — F31.9 BIPOLAR AFFECTIVE DISORDER, REMISSION STATUS UNSPECIFIED: ICD-10-CM

## 2022-04-28 RX ORDER — LAMOTRIGINE 100 MG/1
100 TABLET ORAL DAILY
Qty: 30 TABLET | Refills: 2 | Status: SHIPPED | OUTPATIENT
Start: 2022-04-28 | End: 2022-05-12 | Stop reason: SDUPTHER

## 2022-05-10 NOTE — PROGRESS NOTES
This provider is located at [address], [address], [place] [address]. The Patient is seen remotely using minicabithart. Patient is being seen via telehealth and confirm that they are in a secure environment for this session. The patient's condition being diagnosed/treated is appropriate for telemedicine. The provider identified herself as well as her credentials.   The patient gave consent to be seen remotely, and when consent is given they understand that the consent allows for patient identifiable information to be sent to a third party as needed.   They may refuse to be seen remotely at any time. The electronic data is encrypted and password protected, and the patient has been advised of the potential risks to privacy not withstanding such measures.    Virtual visit via Zoom audio and video due to the COVID-19 pandemic.  Patient is accepting of and agreeable to appointment.  The appointment consisted of the patient and I only.      Mode of visit: Video  Location of provider: [facility] [address], [place] [address].  Location of patient: Home  Does the patient consent to use a video/audio connection for your medical care today? Yes  The visit included audio and video interaction. No technical issues occurred during this visit.      Chief Complaint:  Agitation    History of Present Illness: Mariam Lopes is a 31 y.o. female who presents to the office today for follow-up of mood.  Patient denies feeling depressed.  No SI or HI.  Patient reports being out of hydroxyzine for 2 days and has noticed this medication possibly causes an increase in agitation.  Patient continues to have anxiety with worrying about the worst case scenario.  Patient does note that the anxiety is not constant though.  She continues to have irritability.     Medical Record Review: Reviewed office visit ntoe from 11/16/21, pt seen for anxiety/depression, postpartum depression. She has managed her symptoms by  smoking marijuana. S/p Zoloft in the past and that put her in the mental hospital. She does have bipolar disorder and in the past she has been prescribed Lamictal and that is the only thing that really seemed to help her.  S/p celexa, buspar, hydroxyzine. PHQ-9 score of 14 at visit. Pt denies having any thoughts of harming her baby.     Reviewed recent lab results from 11/16/21, CBC WNL (except MCV 99.8, immature grans 1.5%, abs immature grans 0.13), CMP WNL (except calcium 8.3), TSH and FT4 WNL      ROS:  Review of Systems   Constitutional: Negative for appetite change, diaphoresis, fatigue and unexpected weight change.   HENT: Negative for drooling, tinnitus and trouble swallowing.    Eyes: Negative for visual disturbance.   Respiratory: Negative for cough, chest tightness and shortness of breath.    Cardiovascular: Positive for palpitations. Negative for chest pain.   Gastrointestinal: Positive for diarrhea. Negative for abdominal pain, constipation, nausea and vomiting.   Endocrine: Negative for cold intolerance and heat intolerance.   Genitourinary: Negative for difficulty urinating.   Musculoskeletal: Negative for arthralgias and myalgias.   Skin: Negative for rash.   Allergic/Immunologic: Negative for immunocompromised state.   Neurological: Positive for tremors. Negative for dizziness, seizures and headaches.   Psychiatric/Behavioral: Positive for agitation and dysphoric mood. Negative for hallucinations, self-injury, sleep disturbance and suicidal ideas. The patient is nervous/anxious.        Problem List:  Patient Active Problem List   Diagnosis   • Cervical cancer screening   • Gastroesophageal reflux disease   • Nausea   • Normal pregnancy in multigravida   • RhD negative   • Type A blood, Rh negative   • Underimmunization status       Current Medications:   Current Outpatient Medications   Medication Sig Dispense Refill   • famotidine (PEPCID) 20 MG tablet      • lamoTRIgine (LaMICtal) 100 MG tablet  "Take 1 tablet by mouth Daily. 30 tablet 2   • multivitamin with minerals tablet tablet Take 1 tablet by mouth Daily.     • norelgestromin-ethinyl estradiol (ORTHO EVRA) 150-35 MCG/24HR Place 1 patch on the skin as directed by provider.     • ARIPiprazole (Abilify) 2 MG tablet Take 0.5 tab PO QD x 2 days, if tolerated take 1 tab PO QD 30 tablet 1     No current facility-administered medications for this visit.       Discontinued Medications:  Medications Discontinued During This Encounter   Medication Reason   • dicyclomine (BENTYL) 10 MG capsule *Therapy completed   • cetirizine (zyrTEC) 10 MG tablet *Therapy completed   • busPIRone (BUSPAR) 5 MG tablet *Therapy completed   • hydrOXYzine (ATARAX) 10 MG tablet    • lamoTRIgine (LaMICtal) 100 MG tablet Reorder       Allergy:   Allergies   Allergen Reactions   • Erythromycin Rash   • Sulfa Antibiotics Rash        Past Medical History:  Past Medical History:   Diagnosis Date   • Anxiety    • Complication of pregnancy, childbirth and puerperium 2021    - History of broken ribs in prior pregnanany-so far no problems with this one.   • Depression    • Obsessive-compulsive disorder    • Panic disorder    • Self-injurious behavior    • Substance abuse (HCC)    • Withdrawal symptoms, drug or narcotic (HCC)        Past Surgical History:  Past Surgical History:   Procedure Laterality Date   • COLONOSCOPY     • D & C AND LAPAROSCOPY     • ENDOSCOPY     • TEAR DUCT SURGERY     • VAGINAL BIRTH AFTER  SECTION         Past Psychiatric History:  Began Treatment: Patient has been in therapy since seventh grade due to outbursts.   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 in 2018.   Therapist: Pt did therapy from the age in 7th grade until she was 18 years old.   Admission History: Patient was admitted when she was in high school to [facility] [address] due to SI and HI, and increased agitation after being started on Zoloft.  Medications/Treatment: Patient has been on Zoloft (SI, HI, increased agitation), Celexa, BuSpar, Seroquel (weight gain), guanfacine. Pt reports being on Lamictal in the past and worked well with controlling her symptoms. S/p hydroxyzine (increased agitation)  Self Harm: History of self-harm with cutting her arms and thighs, which she lasted in the seventh/eighth grade.  Patient does admit to hitting herself out of frustration at times.   Suicide Attempts: Denies    Family Psychiatric History:   Diagnoses: Her mother has history of bipolar disorder, anxiety, depression, OCD.  Her father has history of OCD.  Her sister has a history of anxiety, depression, OCD.  Her brother has a history of OCD.  Substance use: Her mother has a history of drug abuse, father has history of EtOH abuse.  Suicide Attempts/Completions: Her mother attempted suicide multiple times.  Patient denies any family history of suicide completions.    Family History   Problem Relation Age of Onset   • Mental illness Mother    • Anxiety disorder Mother    • Bipolar disorder Mother    • Depression Mother    • Drug abuse Mother    • OCD Mother    • Self-Injurious Behavior  Mother    • Suicide Attempts Mother    • Hypertension Father    • Alcohol abuse Father    • OCD Father    • Heart attack Paternal Uncle    • Heart attack Paternal Grandfather    • Anxiety disorder Sister    • Depression Sister    • OCD Sister    • OCD Brother    • Anxiety disorder Maternal Uncle    • Bipolar disorder Maternal Uncle    • Depression Maternal Uncle    • Drug abuse Maternal Uncle    • Anxiety disorder Maternal Grandmother    • Bipolar disorder Maternal Grandmother    • Depression Maternal Grandmother    • Drug abuse Cousin        Substance Abuse History:   Alcohol use: Occasional  Nicotine: Former  Illicit Drug Use: Patient reports history of methamphetamine use (smoking).  Last meth use was  in 2017.  Patient admits to smoking marijuana.  Longest Period Sober: Patient has been sober from methamphetamine and other drug use, except for marijuana, since 2017.  Rehab/AA/NA: Patient reports doing rehab twice.  Last rehab was for 14 months in house treatment program which she completed in 2018.    Social History:  Living Situation: Patient currently lives with her marino and 3-month-old daughter.  Her stepchildren will sometimes stay with her.  Her 5-year-old daughter will stay with her for about half of the week.  Marital/Relationship History: Patient has been with marino for 3 years.  Patient states this is a very supportive relationship and denies any kind of abuse.  Children: Patient has a 3-month-old daughter and a 5-year-old daughter.  Work History/Occupation: Patient works at [employer] and has been there for 3 years.  Education: Patient completed high school and some college.   History: Denies  Legal: Patient reports she is a convicted felon and has many arrests which have all been drug related.    Social History     Socioeconomic History   • Marital status: Single   Tobacco Use   • Smoking status: Current Every Day Smoker     Packs/day: 0.50     Start date: 2005   • Smokeless tobacco: Never Used   Vaping Use   • Vaping Use: Former   • Substances: Nicotine   Substance and Sexual Activity   • Alcohol use: Yes     Comment: occ   • Drug use: Not Currently     Types: Cocaine(coke), Methamphetamines, Heroin, Opium, Marijuana, PCP, LSD   • Sexual activity: Yes       Developmental History:   Place of birth: Patient was born in [place].  Siblings: 1 sister and 1 brother.  Childhood: Her parents have history of drug and alcohol abuse.  Otherwise she denies having any history of abuse or trauma.      Physical Exam:  Physical Exam    Appearance: appears to be of stated age, maintains good eye contact. Pt sitting in parked car.   Behavior: Appropriate, cooperative. No acute distress.  Motor: No  "abnormal movements, tics or tremors are noted. Occasional psychomotor agitation.  Speech: Coherent and spontaneous speech. Normal reaction time to questions. No pressured speech.   Mood: \"I'm pretty good\"  Affect: Full range, appropriate, congruent with spontaneous emotional reactivity. Normal intensity. No emotional blunting. Pt does not appear depressed or anxious.  Thought content: Negative suicidal ideations, negative homicidal ideations. Patient denies any obsession, compulsion, or phobia. No evidence of delusions.  Perceptions: Negative auditory hallucinations, negative visual hallucinations. Pt does not appear to be actively responding to internal stimuli.   Thought process: Logical, goal-directed, coherent, and linear with no evidence of flight of ideas, looseness of associations, thought blocking. Some circumstantiality   Insight/Judgement: Fair/fair  Cognition: Alert and oriented to person, place, and date. Memory intact for recent and remote events. Attention and concentration intact.     Vital Signs:   There were no vitals taken for this visit.     Lab Results:   Office Visit on 03/31/2022   Component Date Value Ref Range Status   • Rapid Strep A Screen 03/31/2022 Negative  Negative, VALID, INVALID, Not Performed Final   • Internal Control 03/31/2022 Passed  Passed Final   • Lot Number 03/31/2022 [id number]   Final   • Expiration Date 03/31/2022 05/31/2023   Final   Office Visit on 11/16/2021   Component Date Value Ref Range Status   • WBC 11/16/2021 8.90  3.40 - 10.80 10*3/mm3 Final   • RBC 11/16/2021 4.40  3.77 - 5.28 10*6/mm3 Final   • Hemoglobin 11/16/2021 14.1  12.0 - 15.9 g/dL Final   • Hematocrit 11/16/2021 43.9  34.0 - 46.6 % Final   • MCV 11/16/2021 99.8 (A) 79.0 - 97.0 fL Final   • MCH 11/16/2021 32.0  26.6 - 33.0 pg Final   • MCHC 11/16/2021 32.1  31.5 - 35.7 g/dL Final   • RDW 11/16/2021 13.0  12.3 - 15.4 % Final   • RDW-SD 11/16/2021 48.3  37.0 - 54.0 fl Final   • MPV 11/16/2021 11.5  6.0 - " 12.0 fL Final   • Platelets 11/16/2021 308  140 - 450 10*3/mm3 Final   • Neutrophil % 11/16/2021 68.4  42.7 - 76.0 % Final   • Lymphocyte % 11/16/2021 20.9  19.6 - 45.3 % Final   • Monocyte % 11/16/2021 7.3  5.0 - 12.0 % Final   • Eosinophil % 11/16/2021 0.8  0.3 - 6.2 % Final   • Basophil % 11/16/2021 1.1  0.0 - 1.5 % Final   • Immature Grans % 11/16/2021 1.5 (A) 0.0 - 0.5 % Final   • Neutrophils, Absolute 11/16/2021 6.09  1.70 - 7.00 10*3/mm3 Final   • Lymphocytes, Absolute 11/16/2021 1.86  0.70 - 3.10 10*3/mm3 Final   • Monocytes, Absolute 11/16/2021 0.65  0.10 - 0.90 10*3/mm3 Final   • Eosinophils, Absolute 11/16/2021 0.07  0.00 - 0.40 10*3/mm3 Final   • Basophils, Absolute 11/16/2021 0.10  0.00 - 0.20 10*3/mm3 Final   • Immature Grans, Absolute 11/16/2021 0.13 (A) 0.00 - 0.05 10*3/mm3 Final   • nRBC 11/16/2021 0.0  0.0 - 0.2 /100 WBC Final   • Glucose 11/16/2021 79  65 - 99 mg/dL Final   • BUN 11/16/2021 17  6 - 20 mg/dL Final   • Creatinine 11/16/2021 0.76  0.57 - 1.00 mg/dL Final   • Sodium 11/16/2021 141  136 - 145 mmol/L Final   • Potassium 11/16/2021 4.7  3.5 - 5.2 mmol/L Final   • Chloride 11/16/2021 106  98 - 107 mmol/L Final   • CO2 11/16/2021 27.5  22.0 - 29.0 mmol/L Final   • Calcium 11/16/2021 8.3 (A) 8.6 - 10.5 mg/dL Final   • Total Protein 11/16/2021 7.1  6.0 - 8.5 g/dL Final   • Albumin 11/16/2021 4.30  3.50 - 5.20 g/dL Final   • ALT (SGPT) 11/16/2021 19  1 - 33 U/L Final   • AST (SGOT) 11/16/2021 19  1 - 32 U/L Final   • Alkaline Phosphatase 11/16/2021 71  39 - 117 U/L Final   • Total Bilirubin 11/16/2021 0.7  0.0 - 1.2 mg/dL Final   • eGFR Non African Amer 11/16/2021 90  >60 mL/min/1.73 Final   • Globulin 11/16/2021 2.8  gm/dL Final   • A/G Ratio 11/16/2021 1.5  g/dL Final   • BUN/Creatinine Ratio 11/16/2021 22.4  7.0 - 25.0 Final   • Anion Gap 11/16/2021 7.5  5.0 - 15.0 mmol/L Final   • TSH 11/16/2021 0.459  0.270 - 4.200 uIU/mL Final   • Free T4 11/16/2021 1.21  0.93 - 1.70 ng/dL Final    T4  results may be falsely increased if patient taking Biotin.   • Iron 11/16/2021 255 (A) 37 - 145 mcg/dL Final   • Iron Saturation 11/16/2021 55 (A) 20 - 50 % Final   • Transferrin 11/16/2021 314  200 - 360 mg/dL Final   • TIBC 11/16/2021 468  298 - 536 mcg/dL Final   • Ferritin 11/16/2021 75.40  13.00 - 150.00 ng/mL Final   • Folate 11/16/2021 12.80  4.78 - 24.20 ng/mL Final   • Vitamin B-12 11/16/2021 341  211 - 946 pg/mL Final   • 25 Hydroxy, Vitamin D 11/16/2021 34.2  ng/ml Final       EKG Results:  No orders to display       Imaging Results:  No Images in the past 120 days found..      Assessment/Plan   Diagnoses and all orders for this visit:    1. Bipolar affective disorder, remission status unspecified (HCC)  -     lamoTRIgine (LaMICtal) 100 MG tablet; Take 1 tablet by mouth Daily.  Dispense: 30 tablet; Refill: 2  -     ARIPiprazole (Abilify) 2 MG tablet; Take 0.5 tab PO QD x 2 days, if tolerated take 1 tab PO QD  Dispense: 30 tablet; Refill: 1    2. Mixed obsessional thoughts and acts  -     lamoTRIgine (LaMICtal) 100 MG tablet; Take 1 tablet by mouth Daily.  Dispense: 30 tablet; Refill: 2  -     ARIPiprazole (Abilify) 2 MG tablet; Take 0.5 tab PO QD x 2 days, if tolerated take 1 tab PO QD  Dispense: 30 tablet; Refill: 1    Presentation seems most consistent with bipolar disorder and anxiety, likely OCD.  We will continue Lamictal at current dose for management of bipolar, agitation, anxiety, OCD, and overall mood.  We will discontinue hydroxyzine due to reported side effects.  We will start patient on Abilify for management of bipolar, anxiety, OCD, agitation, and overall mood.  Follow-up in 1 month.  Addressed all questions and concerns.      Visit Diagnoses:    ICD-10-CM ICD-9-CM   1. Bipolar affective disorder, remission status unspecified (HCC)  F31.9 296.80   2. Mixed obsessional thoughts and acts  F42.2 300.3       PLAN:  1. Safety: No acute safety concerns at this time.  2. Therapy: Pt declines  psychotherapy referral at this time.  3. Risk Assessment: Risk of self-harm acutely is moderate.  Risk factors include anxiety disorder, mood disorder, family history of suicide attempts, history of self harm in the past, and recent psychosocial stressors (pandemic). Protective factors include denies access to guns/weapons, no present SI, no history of suicide attempts in the past, minimal AODA, healthcare seeking, future orientation, willingness to engage in care.  Risk of self-harm chronically is also moderate, but could be further elevated in the event of treatment noncompliance and/or AODA.  4. Labs/Diagnostics Ordered:   No orders of the defined types were placed in this encounter.    5. Medications:   New Medications Ordered This Visit   Medications   • lamoTRIgine (LaMICtal) 100 MG tablet     Sig: Take 1 tablet by mouth Daily.     Dispense:  30 tablet     Refill:  2   • ARIPiprazole (Abilify) 2 MG tablet     Sig: Take 0.5 tab PO QD x 2 days, if tolerated take 1 tab PO QD     Dispense:  30 tablet     Refill:  1     Discussed all risks, benefits, alternatives, and side effects of Lamictal, including but not limited to rash, rebound depressive or manic symptoms if prompt discontinuation, GI upset, agitation, and sedation. Pt instructed to avoid driving and doing other tasks or actions that require to be alert until knowing how the drug affects them. Pt informed that birth control pills and other hormone-based birth control may not work as well to prevent pregnancy and advised to use some other kind of birth control, such as condoms, while taking this med. Discussed the need for pt to immediately call the office for any new or worsening symptoms, such as rash, worsening depression; feeling nervous or restless; suicidal thoughts or actions; or other changes changes in mood or behavior, and all other concerns. Pt educated on med compliance and the risks of suddenly stopping this medication or missing doses. Pt  verbalized understanding and is agreeable to taking Lamictal. Addressed all questions and concerns.     Discussed all risks, benefits, alternatives, and side effects of Aripiprazole, including but not limited to GI upset, headache, activating/sedating effects, dyslipidemia, extrapyramidal symptoms (dystonia, drug-induced parkinsonism, akathisia, tardive dyskinesia), lowering of seizure threshold, hematologic abnormalities, hyperglycemia, impulse control disorders, increased mortality in elderly patients with dementia-related psychosis, neuroleptic malignant syndrome, sexual dysfunction, orthostatic hypotension, falls risk in older adults, and temperature dysregulation. Pt instructed to avoid driving and doing other tasks or actions that require to be alert until knowing how the drug affects them.  Pt educated on the need to practice safe sex while taking this med. Discussed the need for pt to immediately call the office for any new or worsening symptoms, such as worsening depression; feeling nervous or restless; suicidal thoughts or actions; or other changes changes in mood or behavior, and all other concerns. Pt educated on med compliance and the risks of suddenly stopping this medication or missing doses. Pt verbalized understanding and is agreeable to taking Aripiprazole. Addressed all questions and concerns.       6. Follow up:   F/u in 1 month.    TREATMENT PLAN/GOALS: Continue supportive psychotherapy efforts and medications as indicated. Treatment and medication options discussed during today's visit. Patient ackowledged and verbally consented to continue with current treatment plan and was educated on the importance of compliance with treatment and follow-up appointments.    MEDICATION ISSUES:  WYATT reviewed as expected.  Discussed medication options and treatment plan of prescribed medication as well as the risks, benefits, and side effects including potential falls, possible impaired driving and  metabolic adversities among others. Patient is agreeable to call the office with any worsening of symptoms or onset of side effects. Patient is agreeable to call 911 or go to the nearest ER should he/she begin having SI/HI. No medication side effects or related complaints today.            This document has been electronically signed by Melyssa Dang PA-C  May 12, 2022 13:57 EDT      Part of this note may be an electronic transcription/translation of spoken language to printed text using the Dragon Dictation System.

## 2022-05-12 ENCOUNTER — TELEMEDICINE (OUTPATIENT)
Dept: BEHAVIORAL HEALTH | Facility: CLINIC | Age: 32
End: 2022-05-12

## 2022-05-12 DIAGNOSIS — F42.2 MIXED OBSESSIONAL THOUGHTS AND ACTS: ICD-10-CM

## 2022-05-12 DIAGNOSIS — F31.9 BIPOLAR AFFECTIVE DISORDER, REMISSION STATUS UNSPECIFIED: ICD-10-CM

## 2022-05-12 PROCEDURE — 99213 OFFICE O/P EST LOW 20 MIN: CPT | Performed by: PHYSICIAN ASSISTANT

## 2022-05-12 RX ORDER — LAMOTRIGINE 100 MG/1
100 TABLET ORAL DAILY
Qty: 30 TABLET | Refills: 2 | Status: SHIPPED | OUTPATIENT
Start: 2022-05-12 | End: 2022-06-14 | Stop reason: SDUPTHER

## 2022-05-12 RX ORDER — ARIPIPRAZOLE 2 MG/1
TABLET ORAL
Qty: 30 TABLET | Refills: 1 | Status: SHIPPED | OUTPATIENT
Start: 2022-05-12 | End: 2022-06-14 | Stop reason: SDUPTHER

## 2022-06-13 NOTE — PROGRESS NOTES
This provider is located at [address] [facility] [address], [address], [place] [address]. The Patient is seen remotely using [employer]. Patient is being seen via telehealth and confirm that they are in a secure environment for this session. The patient's condition being diagnosed/treated is appropriate for telemedicine. The provider identified herself as well as her credentials.   The patient gave consent to be seen remotely, and when consent is given they understand that the consent allows for patient identifiable information to be sent to a third party as needed.   They may refuse to be seen remotely at any time. The electronic data is encrypted and password protected, and the patient has been advised of the potential risks to privacy not withstanding such measures.    Virtual visit via Zoom audio and video due to the COVID-19 pandemic.  Patient is accepting of and agreeable to appointment.  The appointment consisted of the patient and I only.      Mode of visit: Video  Location of provider: [facility] [address], [place] [address].  Location of patient: Home  Does the patient consent to use a video/audio connection for your medical care today? Yes  The visit included audio and video interaction. No technical issues occurred during this visit.      Chief Complaint:  Agitation    History of Present Illness: Mariam Lopes is a 31 y.o. female who presents to the office today for follow-up of mood.  Patient has been taking meds as prescribed and tolerating them well without any complications.  Patient has had an improvement in her mood since starting Abilify.  Patient has not been as irritable since taking Abilify 2 mg.  She denies feeling depressed.  No SI or HI.  Patient continues to have some irritability mostly at work.  Her anxiety has been much more manageable, she does continues to worry about the worst case scenario but this has improved and has not bothered her as much.  Patient denies having  any other symptoms or complaints at this time. No symptoms of melany or hypomania.    Medical Record Review: Reviewed office visit ntoe from 11/16/21, pt seen for anxiety/depression, postpartum depression. She has managed her symptoms by smoking marijuana. S/p Zoloft in the past and that put her in the mental hospital. She does have bipolar disorder and in the past she has been prescribed Lamictal and that is the only thing that really seemed to help her.  S/p celexa, buspar, hydroxyzine. PHQ-9 score of 14 at visit. Pt denies having any thoughts of harming her baby.     Reviewed recent lab results from 11/16/21, CBC WNL (except MCV 99.8, immature grans 1.5%, abs immature grans 0.13), CMP WNL (except calcium 8.3), TSH and FT4 WNL      ROS:  Review of Systems   Constitutional: Negative for appetite change, diaphoresis, fatigue and unexpected weight change.   HENT: Negative for drooling, tinnitus and trouble swallowing.    Eyes: Negative for visual disturbance.   Respiratory: Negative for cough, chest tightness and shortness of breath.    Cardiovascular: Negative for chest pain and palpitations.   Gastrointestinal: Positive for diarrhea. Negative for abdominal pain, constipation, nausea and vomiting.   Endocrine: Negative for cold intolerance and heat intolerance.   Genitourinary: Negative for difficulty urinating.   Musculoskeletal: Negative for arthralgias and myalgias.   Skin: Negative for rash.   Allergic/Immunologic: Negative for immunocompromised state.   Neurological: Negative for dizziness, tremors, seizures and headaches.   Psychiatric/Behavioral: Positive for agitation and dysphoric mood. Negative for hallucinations, self-injury, sleep disturbance and suicidal ideas. The patient is nervous/anxious.        Problem List:  Patient Active Problem List   Diagnosis   • Cervical cancer screening   • Gastroesophageal reflux disease   • Nausea   • Normal pregnancy in multigravida   • RhD negative   • Type A blood, Rh  "negative   • Underimmunization status       Current Medications:   Current Outpatient Medications   Medication Sig Dispense Refill   • ARIPiprazole (Abilify) 2 MG tablet Take 1.5 tab PO QD x 1 week, if tolerated take 2 tab PO QD 60 tablet 2   • famotidine (PEPCID) 20 MG tablet      • lamoTRIgine (LaMICtal) 100 MG tablet Take 1 tablet by mouth Daily. 30 tablet 2   • multivitamin with minerals tablet tablet Take 1 tablet by mouth Daily.     • norelgestromin-ethinyl estradiol (ORTHO EVRA) 150-35 MCG/24HR Place 1 patch on the skin as directed by provider.       No current facility-administered medications for this visit.       Discontinued Medications:  Medications Discontinued During This Encounter   Medication Reason   • lamoTRIgine (LaMICtal) 100 MG tablet Reorder   • ARIPiprazole (Abilify) 2 MG tablet Reorder       Allergy:   Allergies   Allergen Reactions   • Erythromycin Rash   • Sulfa Antibiotics Rash        Past Medical History:  Past Medical History:   Diagnosis Date   • Anxiety    • Complication of pregnancy, childbirth and puerperium 2021    - History of broken ribs in prior pregnanany-so far no problems with this one.   • Depression    • Obsessive-compulsive disorder    • Panic disorder    • Self-injurious behavior    • Substance abuse (HCC)    • Withdrawal symptoms, drug or narcotic (HCC)        Past Surgical History:  Past Surgical History:   Procedure Laterality Date   • COLONOSCOPY     • D & C AND LAPAROSCOPY     • ENDOSCOPY     • TEAR DUCT SURGERY     • VAGINAL BIRTH AFTER  SECTION         Past Psychiatric History:  Began Treatment: Patient has been in therapy since seventh grade due to outbursts.   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   Therapist: Pt did therapy from the age in 7th grade until she was 18 years old.   Admission History: Patient was " admitted when she was in high school to [facility] due to SI and HI, and increased agitation after being started on Zoloft.  Medications/Treatment: Patient has been on Zoloft (SI, HI, increased agitation), Celexa, BuSpar, Seroquel (weight gain), guanfacine. Pt reports being on Lamictal in the past and worked well with controlling her symptoms. S/p hydroxyzine (increased agitation)  Self Harm: History of self-harm with cutting her arms and thighs, which she lasted in the seventh/eighth grade.  Patient does admit to hitting herself out of frustration at times.   Suicide Attempts: Denies    Family Psychiatric History:   Diagnoses: Her mother has history of bipolar disorder, anxiety, depression, OCD.  Her father has history of OCD.  Her sister has a history of anxiety, depression, OCD.  Her brother has a history of OCD.  Substance use: Her mother has a history of drug abuse, father has history of EtOH abuse.  Suicide Attempts/Completions: Her mother attempted suicide multiple times.  Patient denies any family history of suicide completions.    Family History   Problem Relation Age of Onset   • Mental illness Mother    • Anxiety disorder Mother    • Bipolar disorder Mother    • Depression Mother    • Drug abuse Mother    • OCD Mother    • Self-Injurious Behavior  Mother    • Suicide Attempts Mother    • Hypertension Father    • Alcohol abuse Father    • OCD Father    • Heart attack Paternal Uncle    • Heart attack Paternal Grandfather    • Anxiety disorder Sister    • Depression Sister    • OCD Sister    • OCD Brother    • Anxiety disorder Maternal Uncle    • Bipolar disorder Maternal Uncle    • Depression Maternal Uncle    • Drug abuse Maternal Uncle    • Anxiety disorder Maternal Grandmother    • Bipolar disorder Maternal Grandmother    • Depression Maternal Grandmother    • Drug abuse Cousin        Substance Abuse History:   Alcohol use: Occasional  Nicotine: Former  Illicit Drug Use: Patient reports history of  methamphetamine use (smoking).  Last meth use was in 2017.  Patient admits to smoking marijuana.  Longest Period Sober: Patient has been sober from methamphetamine and other drug use, except for marijuana, since 2017.  Rehab/AA/NA: Patient reports doing rehab twice.  Last rehab was for 14 months in house treatment program which she completed in 2018.    Social History:  Living Situation: Patient currently lives with her fiancé and 3-month-old daughter.  Her stepchildren will sometimes stay with her.  Her 5-year-old daughter will stay with her for about half of the week.  Marital/Relationship History: Patient has been with marino for 3 years.  Patient states this is a very supportive relationship and denies any kind of abuse.  Children: Patient has a 3-month-old daughter and a 5-year-old daughter.  Work History/Occupation: Patient works at [employer] and has been there for 3 years.  Education: Patient completed high school and some college.   History: Denies  Legal: Patient reports she is a convicted felon and has many arrests which have all been drug related.    Social History     Socioeconomic History   • Marital status: Single   Tobacco Use   • Smoking status: Current Every Day Smoker     Packs/day: 0.50     Start date: 2005   • Smokeless tobacco: Never Used   Vaping Use   • Vaping Use: Former   • Substances: Nicotine   Substance and Sexual Activity   • Alcohol use: Yes     Comment: occ   • Drug use: Not Currently     Types: Cocaine(coke), Methamphetamines, Heroin, Opium, Marijuana, PCP, LSD   • Sexual activity: Yes       Developmental History:   Place of birth: Patient was born in [place].  Siblings: 1 sister and 1 brother.  Childhood: Her parents have history of drug and alcohol abuse.  Otherwise she denies having any history of abuse or trauma.      Physical Exam:  Physical Exam    Appearance: appears to be of stated age, maintains good eye contact.   Behavior: Appropriate, cooperative. No acute  "distress.  Motor: No abnormal movements, tics or tremors are noted. Occasional psychomotor agitation.  Speech: Coherent and spontaneous speech. Normal reaction time to questions. No pressured speech.   Mood: \"I'm good\"  Affect: Full range, appropriate, congruent with spontaneous emotional reactivity. Normal intensity. No emotional blunting. Pt does not appear depressed or anxious. Pt is very pleasant and smiling.  Thought content: Negative suicidal ideations, negative homicidal ideations. Patient denies any obsession, compulsion, or phobia. No evidence of delusions.  Perceptions: Negative auditory hallucinations, negative visual hallucinations. Pt does not appear to be actively responding to internal stimuli.   Thought process: Logical, goal-directed, coherent, and linear with no evidence of flight of ideas, looseness of associations, thought blocking, circumstantiality.  Insight/Judgement: Fair/fair  Cognition: Alert and oriented to person, place, and date. Memory intact for recent and remote events. Attention and concentration intact.     Vital Signs:   There were no vitals taken for this visit.     Lab Results:   Office Visit on 03/31/2022   Component Date Value Ref Range Status   • Rapid Strep A Screen 03/31/2022 Negative  Negative, VALID, INVALID, Not Performed Final   • Internal Control 03/31/2022 Passed  Passed Final   • Lot Number 03/31/2022 [id number]   Final   • Expiration Date 03/31/2022 05/31/2023   Final   Office Visit on 11/16/2021   Component Date Value Ref Range Status   • WBC 11/16/2021 8.90  3.40 - 10.80 10*3/mm3 Final   • RBC 11/16/2021 4.40  3.77 - 5.28 10*6/mm3 Final   • Hemoglobin 11/16/2021 14.1  12.0 - 15.9 g/dL Final   • Hematocrit 11/16/2021 43.9  34.0 - 46.6 % Final   • MCV 11/16/2021 99.8 (A) 79.0 - 97.0 fL Final   • MCH 11/16/2021 32.0  26.6 - 33.0 pg Final   • MCHC 11/16/2021 32.1  31.5 - 35.7 g/dL Final   • RDW 11/16/2021 13.0  12.3 - 15.4 % Final   • RDW-SD 11/16/2021 48.3  37.0 - 54.0 " fl Final   • MPV 11/16/2021 11.5  6.0 - 12.0 fL Final   • Platelets 11/16/2021 308  140 - 450 10*3/mm3 Final   • Neutrophil % 11/16/2021 68.4  42.7 - 76.0 % Final   • Lymphocyte % 11/16/2021 20.9  19.6 - 45.3 % Final   • Monocyte % 11/16/2021 7.3  5.0 - 12.0 % Final   • Eosinophil % 11/16/2021 0.8  0.3 - 6.2 % Final   • Basophil % 11/16/2021 1.1  0.0 - 1.5 % Final   • Immature Grans % 11/16/2021 1.5 (A) 0.0 - 0.5 % Final   • Neutrophils, Absolute 11/16/2021 6.09  1.70 - 7.00 10*3/mm3 Final   • Lymphocytes, Absolute 11/16/2021 1.86  0.70 - 3.10 10*3/mm3 Final   • Monocytes, Absolute 11/16/2021 0.65  0.10 - 0.90 10*3/mm3 Final   • Eosinophils, Absolute 11/16/2021 0.07  0.00 - 0.40 10*3/mm3 Final   • Basophils, Absolute 11/16/2021 0.10  0.00 - 0.20 10*3/mm3 Final   • Immature Grans, Absolute 11/16/2021 0.13 (A) 0.00 - 0.05 10*3/mm3 Final   • nRBC 11/16/2021 0.0  0.0 - 0.2 /100 WBC Final   • Glucose 11/16/2021 79  65 - 99 mg/dL Final   • BUN 11/16/2021 17  6 - 20 mg/dL Final   • Creatinine 11/16/2021 0.76  0.57 - 1.00 mg/dL Final   • Sodium 11/16/2021 141  136 - 145 mmol/L Final   • Potassium 11/16/2021 4.7  3.5 - 5.2 mmol/L Final   • Chloride 11/16/2021 106  98 - 107 mmol/L Final   • CO2 11/16/2021 27.5  22.0 - 29.0 mmol/L Final   • Calcium 11/16/2021 8.3 (A) 8.6 - 10.5 mg/dL Final   • Total Protein 11/16/2021 7.1  6.0 - 8.5 g/dL Final   • Albumin 11/16/2021 4.30  3.50 - 5.20 g/dL Final   • ALT (SGPT) 11/16/2021 19  1 - 33 U/L Final   • AST (SGOT) 11/16/2021 19  1 - 32 U/L Final   • Alkaline Phosphatase 11/16/2021 71  39 - 117 U/L Final   • Total Bilirubin 11/16/2021 0.7  0.0 - 1.2 mg/dL Final   • eGFR Non African Amer 11/16/2021 90  >60 mL/min/1.73 Final   • Globulin 11/16/2021 2.8  gm/dL Final   • A/G Ratio 11/16/2021 1.5  g/dL Final   • BUN/Creatinine Ratio 11/16/2021 22.4  7.0 - 25.0 Final   • Anion Gap 11/16/2021 7.5  5.0 - 15.0 mmol/L Final   • TSH 11/16/2021 0.459  0.270 - 4.200 uIU/mL Final   • Free T4  11/16/2021 1.21  0.93 - 1.70 ng/dL Final    T4 results may be falsely increased if patient taking Biotin.   • Iron 11/16/2021 255 (A) 37 - 145 mcg/dL Final   • Iron Saturation 11/16/2021 55 (A) 20 - 50 % Final   • Transferrin 11/16/2021 314  200 - 360 mg/dL Final   • TIBC 11/16/2021 468  298 - 536 mcg/dL Final   • Ferritin 11/16/2021 75.40  13.00 - 150.00 ng/mL Final   • Folate 11/16/2021 12.80  4.78 - 24.20 ng/mL Final   • Vitamin B-12 11/16/2021 341  211 - 946 pg/mL Final   • 25 Hydroxy, Vitamin D 11/16/2021 34.2  ng/ml Final       EKG Results:  No orders to display       Imaging Results:  No Images in the past 120 days found..      Assessment/Plan   Diagnoses and all orders for this visit:    1. Bipolar affective disorder, remission status unspecified (HCC)  -     lamoTRIgine (LaMICtal) 100 MG tablet; Take 1 tablet by mouth Daily.  Dispense: 30 tablet; Refill: 2  -     ARIPiprazole (Abilify) 2 MG tablet; Take 1.5 tab PO QD x 1 week, if tolerated take 2 tab PO QD  Dispense: 60 tablet; Refill: 2    2. Mixed obsessional thoughts and acts  -     lamoTRIgine (LaMICtal) 100 MG tablet; Take 1 tablet by mouth Daily.  Dispense: 30 tablet; Refill: 2  -     ARIPiprazole (Abilify) 2 MG tablet; Take 1.5 tab PO QD x 1 week, if tolerated take 2 tab PO QD  Dispense: 60 tablet; Refill: 2    Presentation seems most consistent with bipolar disorder and anxiety, likely OCD.  We will continue Lamictal at current dose for management of bipolar, agitation, anxiety, OCD, and overall mood.  Will increase Abilify for management of bipolar, anxiety, OCD, agitation, and overall mood.  Follow-up in 1 month.  Addressed all questions and concerns.      Visit Diagnoses:    ICD-10-CM ICD-9-CM   1. Bipolar affective disorder, remission status unspecified (HCC)  F31.9 296.80   2. Mixed obsessional thoughts and acts  F42.2 300.3       PLAN:  1. Safety: No acute safety concerns at this time.  2. Therapy: Pt declines psychotherapy referral at this  time.  3. Risk Assessment: Risk of self-harm acutely is moderate.  Risk factors include anxiety disorder, mood disorder, family history of suicide attempts, history of self harm in the past, and recent psychosocial stressors (pandemic). Protective factors include denies access to guns/weapons, no present SI, no history of suicide attempts in the past, minimal AODA, healthcare seeking, future orientation, willingness to engage in care.  Risk of self-harm chronically is also moderate, but could be further elevated in the event of treatment noncompliance and/or AODA.  4. Labs/Diagnostics Ordered:   No orders of the defined types were placed in this encounter.    5. Medications:   New Medications Ordered This Visit   Medications   • lamoTRIgine (LaMICtal) 100 MG tablet     Sig: Take 1 tablet by mouth Daily.     Dispense:  30 tablet     Refill:  2   • ARIPiprazole (Abilify) 2 MG tablet     Sig: Take 1.5 tab PO QD x 1 week, if tolerated take 2 tab PO QD     Dispense:  60 tablet     Refill:  2     Discussed all risks, benefits, alternatives, and side effects of Lamictal, including but not limited to rash, rebound depressive or manic symptoms if prompt discontinuation, GI upset, agitation, and sedation. Pt instructed to avoid driving and doing other tasks or actions that require to be alert until knowing how the drug affects them. Pt informed that birth control pills and other hormone-based birth control may not work as well to prevent pregnancy and advised to use some other kind of birth control, such as condoms, while taking this med. Discussed the need for pt to immediately call the office for any new or worsening symptoms, such as rash, worsening depression; feeling nervous or restless; suicidal thoughts or actions; or other changes changes in mood or behavior, and all other concerns. Pt educated on med compliance and the risks of suddenly stopping this medication or missing doses. Pt verbalized understanding and is  agreeable to taking Lamictal. Addressed all questions and concerns.     Discussed all risks, benefits, alternatives, and side effects of Aripiprazole, including but not limited to GI upset, headache, activating/sedating effects, dyslipidemia, extrapyramidal symptoms (dystonia, drug-induced parkinsonism, akathisia, tardive dyskinesia), lowering of seizure threshold, hematologic abnormalities, hyperglycemia, impulse control disorders, increased mortality in elderly patients with dementia-related psychosis, neuroleptic malignant syndrome, sexual dysfunction, orthostatic hypotension, falls risk in older adults, and temperature dysregulation. Pt instructed to avoid driving and doing other tasks or actions that require to be alert until knowing how the drug affects them.  Pt educated on the need to practice safe sex while taking this med. Discussed the need for pt to immediately call the office for any new or worsening symptoms, such as worsening depression; feeling nervous or restless; suicidal thoughts or actions; or other changes changes in mood or behavior, and all other concerns. Pt educated on med compliance and the risks of suddenly stopping this medication or missing doses. Pt verbalized understanding and is agreeable to taking Aripiprazole. Addressed all questions and concerns.       6. Follow up:   F/u in 1 month.    TREATMENT PLAN/GOALS: Continue supportive psychotherapy efforts and medications as indicated. Treatment and medication options discussed during today's visit. Patient ackowledged and verbally consented to continue with current treatment plan and was educated on the importance of compliance with treatment and follow-up appointments.    MEDICATION ISSUES:  WYATT reviewed as expected.  Discussed medication options and treatment plan of prescribed medication as well as the risks, benefits, and side effects including potential falls, possible impaired driving and metabolic adversities among others.  Patient is agreeable to call the office with any worsening of symptoms or onset of side effects. Patient is agreeable to call 911 or go to the nearest ER should he/she begin having SI/HI. No medication side effects or related complaints today.            This document has been electronically signed by Melyssa Dang PA-C  June 14, 2022 16:22 EDT      Part of this note may be an electronic transcription/translation of spoken language to printed text using the Dragon Dictation System.

## 2022-06-14 ENCOUNTER — TELEMEDICINE (OUTPATIENT)
Dept: BEHAVIORAL HEALTH | Facility: CLINIC | Age: 32
End: 2022-06-14

## 2022-06-14 DIAGNOSIS — F31.9 BIPOLAR AFFECTIVE DISORDER, REMISSION STATUS UNSPECIFIED: ICD-10-CM

## 2022-06-14 DIAGNOSIS — F42.2 MIXED OBSESSIONAL THOUGHTS AND ACTS: ICD-10-CM

## 2022-06-14 PROCEDURE — 99213 OFFICE O/P EST LOW 20 MIN: CPT | Performed by: PHYSICIAN ASSISTANT

## 2022-06-14 RX ORDER — ARIPIPRAZOLE 2 MG/1
TABLET ORAL
Qty: 60 TABLET | Refills: 2 | Status: SHIPPED | OUTPATIENT
Start: 2022-06-14 | End: 2022-07-15

## 2022-06-14 RX ORDER — LAMOTRIGINE 100 MG/1
100 TABLET ORAL DAILY
Qty: 30 TABLET | Refills: 2 | Status: SHIPPED | OUTPATIENT
Start: 2022-06-14 | End: 2022-07-19 | Stop reason: SDUPTHER

## 2022-07-19 ENCOUNTER — TELEMEDICINE (OUTPATIENT)
Dept: BEHAVIORAL HEALTH | Facility: CLINIC | Age: 32
End: 2022-07-19

## 2022-07-19 DIAGNOSIS — F31.9 BIPOLAR AFFECTIVE DISORDER, REMISSION STATUS UNSPECIFIED: ICD-10-CM

## 2022-07-19 DIAGNOSIS — F42.2 MIXED OBSESSIONAL THOUGHTS AND ACTS: ICD-10-CM

## 2022-07-19 PROCEDURE — 99213 OFFICE O/P EST LOW 20 MIN: CPT | Performed by: PHYSICIAN ASSISTANT

## 2022-07-19 RX ORDER — CHLORHEXIDINE GLUCONATE 0.12 MG/ML
RINSE ORAL
COMMUNITY
Start: 2022-06-22 | End: 2022-09-13

## 2022-07-19 RX ORDER — AMOXICILLIN 500 MG/1
CAPSULE ORAL
COMMUNITY
Start: 2022-06-22 | End: 2022-09-13

## 2022-07-19 RX ORDER — LAMOTRIGINE 100 MG/1
100 TABLET ORAL DAILY
Qty: 30 TABLET | Refills: 2 | Status: SHIPPED | OUTPATIENT
Start: 2022-07-19 | End: 2022-08-18

## 2022-07-19 RX ORDER — ARIPIPRAZOLE 2 MG/1
TABLET ORAL
Qty: 60 TABLET | Refills: 2 | Status: SHIPPED | OUTPATIENT
Start: 2022-07-19 | End: 2022-09-13 | Stop reason: SDUPTHER

## 2022-08-18 DIAGNOSIS — F42.2 MIXED OBSESSIONAL THOUGHTS AND ACTS: ICD-10-CM

## 2022-08-18 DIAGNOSIS — F31.9 BIPOLAR AFFECTIVE DISORDER, REMISSION STATUS UNSPECIFIED: ICD-10-CM

## 2022-08-18 RX ORDER — LAMOTRIGINE 100 MG/1
100 TABLET ORAL DAILY
Qty: 30 TABLET | Refills: 2 | Status: SHIPPED | OUTPATIENT
Start: 2022-08-18 | End: 2022-09-13 | Stop reason: SDUPTHER

## 2022-08-18 NOTE — TELEPHONE ENCOUNTER
Medication refill request. This medication was last reordered 07/19/2022 with 2 refills. Refill may not be appropriate. Medication refused.

## 2022-09-12 NOTE — PROGRESS NOTES
"This provider is located at [address] [facility] [address], [address], [place] [address]. The Patient is seen remotely using [employer]. Patient is being seen via telehealth and confirm that they are in a secure environment for this session. The patient's condition being diagnosed/treated is appropriate for telemedicine. The provider identified herself as well as her credentials.   The patient gave consent to be seen remotely, and when consent is given they understand that the consent allows for patient identifiable information to be sent to a third party as needed.   They may refuse to be seen remotely at any time. The electronic data is encrypted and password protected, and the patient has been advised of the potential risks to privacy not withstanding such measures.    Virtual visit via Zoom audio and video due to the COVID-19 pandemic.  Patient is accepting of and agreeable to appointment.  The appointment consisted of the patient and I only.      Mode of visit: Video  Location of provider: [facility] [address], [place] [address].  Location of patient: Work  Does the patient consent to use a video/audio connection for your medical care today? Yes  The visit included audio and video interaction. No technical issues occurred during this visit.      Chief Complaint:  Anxiety     History of Present Illness: Mariam Lopes is a 32 y.o. female who presents to the office today for follow-up of mood.  Patient has been taking meds as prescribed and tolerating them well without any complications.  Pt denies feeling depressed. No SI or HI.  Patient will \"sometimes\" have anxiety, but states this is very manageable and denies feeling overwhelming.  No panic attacks.  Patient denies feeling irritable.  Patient denies having any difficulty sleeping.  No manic or hypomanic symptoms.  Patient states she is doing \"really good\" and feels like she is well controlled with current med regimen.    Medical Record Review: " Reviewed office visit ntoe from 11/16/21, pt seen for anxiety/depression, postpartum depression. She has managed her symptoms by smoking marijuana. S/p Zoloft in the past and that put her in the mental hospital. She does have bipolar disorder and in the past she has been prescribed Lamictal and that is the only thing that really seemed to help her.  S/p celexa, buspar, hydroxyzine. PHQ-9 score of 14 at visit. Pt denies having any thoughts of harming her baby.     Reviewed recent lab results from 11/16/21, CBC WNL (except MCV 99.8, immature grans 1.5%, abs immature grans 0.13), CMP WNL (except calcium 8.3), TSH and FT4 WNL      ROS:  Review of Systems   Constitutional: Negative for appetite change, diaphoresis, fatigue and unexpected weight change.   HENT: Negative for drooling, tinnitus and trouble swallowing.    Eyes: Negative for visual disturbance.   Respiratory: Negative for cough, chest tightness and shortness of breath.    Cardiovascular: Negative for chest pain and palpitations.   Gastrointestinal: Negative for abdominal pain, constipation, diarrhea, nausea and vomiting.   Endocrine: Negative for cold intolerance and heat intolerance.   Genitourinary: Negative for difficulty urinating.   Musculoskeletal: Negative for arthralgias and myalgias.   Skin: Negative for rash.   Allergic/Immunologic: Negative for immunocompromised state.   Neurological: Negative for dizziness, tremors, seizures and headaches.   Psychiatric/Behavioral: Negative for agitation, dysphoric mood, hallucinations, self-injury, sleep disturbance and suicidal ideas. The patient is nervous/anxious.        Problem List:  Patient Active Problem List   Diagnosis   • Cervical cancer screening   • Gastroesophageal reflux disease   • Nausea   • Normal pregnancy in multigravida   • RhD negative   • Type A blood, Rh negative   • Underimmunization status       Current Medications:   Current Outpatient Medications   Medication Sig Dispense Refill   •  "ARIPiprazole (Abilify) 2 MG tablet Take 2 tab PO QD 60 tablet 3   • famotidine (PEPCID) 20 MG tablet      • lamoTRIgine (LaMICtal) 100 MG tablet Take 1 tablet by mouth Daily. 30 tablet 3   • multivitamin with minerals tablet tablet Take 1 tablet by mouth Daily.     • norelgestromin-ethinyl estradiol (ORTHO EVRA) 150-35 MCG/24HR Place 1 patch on the skin as directed by provider.       No current facility-administered medications for this visit.       Discontinued Medications:  Medications Discontinued During This Encounter   Medication Reason   • amoxicillin (AMOXIL) 500 MG capsule *Therapy completed   • chlorhexidine (PERIDEX) 0.12 % solution *Therapy completed   • ARIPiprazole (Abilify) 2 MG tablet Reorder   • lamoTRIgine (LaMICtal) 100 MG tablet Reorder       Allergy:   Allergies   Allergen Reactions   • Erythromycin Rash   • Sulfa Antibiotics Rash        Past Medical History:  Past Medical History:   Diagnosis Date   • Anxiety    • Complication of pregnancy, childbirth and puerperium 2021    - History of broken ribs in prior pregnanany-so far no problems with this one.   • Depression    • Obsessive-compulsive disorder    • Panic disorder    • Self-injurious behavior    • Substance abuse (HCC)    • Withdrawal symptoms, drug or narcotic (HCC)        Past Surgical History:  Past Surgical History:   Procedure Laterality Date   • COLONOSCOPY     • D & C AND LAPAROSCOPY     • ENDOSCOPY     • TEAR DUCT SURGERY     • VAGINAL BIRTH AFTER  SECTION         Past Psychiatric History:  Began Treatment: Patient has been in therapy since seventh grade due to outbursts.   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   Therapist: Pt did therapy from the age in 7th grade until she was 18 years old.   Admission History: Patient was admitted when she was in high school to [facility]" due to SI and HI, and increased agitation after being started on Zoloft.  Medications/Treatment: Patient has been on Zoloft (SI, HI, increased agitation), Celexa, BuSpar, Seroquel (weight gain), guanfacine. Pt reports being on Lamictal in the past and worked well with controlling her symptoms. S/p hydroxyzine (increased agitation)  Self Harm: History of self-harm with cutting her arms and thighs, which she lasted in the seventh/eighth grade.  Patient does admit to hitting herself out of frustration at times.   Suicide Attempts: Denies    Family Psychiatric History:   Diagnoses: Her mother has history of bipolar disorder, anxiety, depression, OCD.  Her father has history of OCD.  Her sister has a history of anxiety, depression, OCD.  Her brother has a history of OCD.  Substance use: Her mother has a history of drug abuse, father has history of EtOH abuse.  Suicide Attempts/Completions: Her mother attempted suicide multiple times.  Patient denies any family history of suicide completions.    Family History   Problem Relation Age of Onset   • Mental illness Mother    • Anxiety disorder Mother    • Bipolar disorder Mother    • Depression Mother    • Drug abuse Mother    • OCD Mother    • Self-Injurious Behavior  Mother    • Suicide Attempts Mother    • Hypertension Father    • Alcohol abuse Father    • OCD Father    • Heart attack Paternal Uncle    • Heart attack Paternal Grandfather    • Anxiety disorder Sister    • Depression Sister    • OCD Sister    • OCD Brother    • Anxiety disorder Maternal Uncle    • Bipolar disorder Maternal Uncle    • Depression Maternal Uncle    • Drug abuse Maternal Uncle    • Anxiety disorder Maternal Grandmother    • Bipolar disorder Maternal Grandmother    • Depression Maternal Grandmother    • Drug abuse Cousin        Substance Abuse History:   Alcohol use: Occasional  Nicotine: Former  Illicit Drug Use: Patient reports history of methamphetamine use (smoking).  Last meth use was in  2017.  Patient admits to smoking marijuana.  Longest Period Sober: Patient has been sober from methamphetamine and other drug use, except for marijuana, since 2017.  Rehab/AA/NA: Patient reports doing rehab twice.  Last rehab was for 14 months in house treatment program which she completed in 2018.    Social History:  Living Situation: Patient currently lives with her marino and 3-month-old daughter.  Her stepchildren will sometimes stay with her.  Her 5-year-old daughter will stay with her for about half of the week.  Marital/Relationship History: Patient has been with marino for 3 years.  Patient states this is a very supportive relationship and denies any kind of abuse.  Children: Patient has a 3-month-old daughter and a 5-year-old daughter.  Work History/Occupation: Patient works at [employer] and has been there for 3 years.  Education: Patient completed high school and some college.   History: Denies  Legal: Patient reports she is a convicted felon and has many arrests which have all been drug related.    Social History     Socioeconomic History   • Marital status: Single   Tobacco Use   • Smoking status: Current Every Day Smoker     Packs/day: 0.50     Start date: 2005   • Smokeless tobacco: Never Used   Vaping Use   • Vaping Use: Former   • Substances: Nicotine   Substance and Sexual Activity   • Alcohol use: Yes     Comment: occ   • Drug use: Not Currently     Types: Cocaine(coke), Methamphetamines, Heroin, Opium, Marijuana, PCP, LSD   • Sexual activity: Yes       Developmental History:   Place of birth: Patient was born in [place].  Siblings: 1 sister and 1 brother.  Childhood: Her parents have history of drug and alcohol abuse.  Otherwise she denies having any history of abuse or trauma.      Physical Exam:  Physical Exam    Appearance: appears to be of stated age, maintains good eye contact.   Behavior: Appropriate, cooperative. No acute distress.  Motor: No abnormal movements  Speech:  "Coherent and spontaneous speech. Normal reaction time to questions. No pressured speech.   Mood: \"I'm really good\"  Affect: Full range, appropriate, congruent with spontaneous emotional reactivity. Normal intensity. No emotional blunting. Pt does not appear depressed or anxious. Pt is very pleasant and smiling.  Thought content: Negative suicidal ideations, negative homicidal ideations. Patient denies any obsession, compulsion, or phobia. No evidence of delusions.  Perceptions: Negative auditory hallucinations, negative visual hallucinations. Pt does not appear to be actively responding to internal stimuli.   Thought process: Logical, goal-directed, coherent, and linear with no evidence of flight of ideas, looseness of associations, thought blocking, circumstantiality.  Insight/Judgement: Fair/fair  Cognition: Alert and oriented to person, place, and date. Memory intact for recent and remote events. Attention and concentration intact.     Vital Signs:   There were no vitals taken for this visit.     Lab Results:   Office Visit on 03/31/2022   Component Date Value Ref Range Status   • Rapid Strep A Screen 03/31/2022 Negative  Negative, VALID, INVALID, Not Performed Final   • Internal Control 03/31/2022 Passed  Passed Final   • Lot Number 03/31/2022 [id number]   Final   • Expiration Date 03/31/2022 05/31/2023   Final   Office Visit on 11/16/2021   Component Date Value Ref Range Status   • WBC 11/16/2021 8.90  3.40 - 10.80 10*3/mm3 Final   • RBC 11/16/2021 4.40  3.77 - 5.28 10*6/mm3 Final   • Hemoglobin 11/16/2021 14.1  12.0 - 15.9 g/dL Final   • Hematocrit 11/16/2021 43.9  34.0 - 46.6 % Final   • MCV 11/16/2021 99.8 (A) 79.0 - 97.0 fL Final   • MCH 11/16/2021 32.0  26.6 - 33.0 pg Final   • MCHC 11/16/2021 32.1  31.5 - 35.7 g/dL Final   • RDW 11/16/2021 13.0  12.3 - 15.4 % Final   • RDW-SD 11/16/2021 48.3  37.0 - 54.0 fl Final   • MPV 11/16/2021 11.5  6.0 - 12.0 fL Final   • Platelets 11/16/2021 308  140 - 450 10*3/mm3 " Final   • Neutrophil % 11/16/2021 68.4  42.7 - 76.0 % Final   • Lymphocyte % 11/16/2021 20.9  19.6 - 45.3 % Final   • Monocyte % 11/16/2021 7.3  5.0 - 12.0 % Final   • Eosinophil % 11/16/2021 0.8  0.3 - 6.2 % Final   • Basophil % 11/16/2021 1.1  0.0 - 1.5 % Final   • Immature Grans % 11/16/2021 1.5 (A) 0.0 - 0.5 % Final   • Neutrophils, Absolute 11/16/2021 6.09  1.70 - 7.00 10*3/mm3 Final   • Lymphocytes, Absolute 11/16/2021 1.86  0.70 - 3.10 10*3/mm3 Final   • Monocytes, Absolute 11/16/2021 0.65  0.10 - 0.90 10*3/mm3 Final   • Eosinophils, Absolute 11/16/2021 0.07  0.00 - 0.40 10*3/mm3 Final   • Basophils, Absolute 11/16/2021 0.10  0.00 - 0.20 10*3/mm3 Final   • Immature Grans, Absolute 11/16/2021 0.13 (A) 0.00 - 0.05 10*3/mm3 Final   • nRBC 11/16/2021 0.0  0.0 - 0.2 /100 WBC Final   • Glucose 11/16/2021 79  65 - 99 mg/dL Final   • BUN 11/16/2021 17  6 - 20 mg/dL Final   • Creatinine 11/16/2021 0.76  0.57 - 1.00 mg/dL Final   • Sodium 11/16/2021 141  136 - 145 mmol/L Final   • Potassium 11/16/2021 4.7  3.5 - 5.2 mmol/L Final   • Chloride 11/16/2021 106  98 - 107 mmol/L Final   • CO2 11/16/2021 27.5  22.0 - 29.0 mmol/L Final   • Calcium 11/16/2021 8.3 (A) 8.6 - 10.5 mg/dL Final   • Total Protein 11/16/2021 7.1  6.0 - 8.5 g/dL Final   • Albumin 11/16/2021 4.30  3.50 - 5.20 g/dL Final   • ALT (SGPT) 11/16/2021 19  1 - 33 U/L Final   • AST (SGOT) 11/16/2021 19  1 - 32 U/L Final   • Alkaline Phosphatase 11/16/2021 71  39 - 117 U/L Final   • Total Bilirubin 11/16/2021 0.7  0.0 - 1.2 mg/dL Final   • eGFR Non African Amer 11/16/2021 90  >60 mL/min/1.73 Final   • Globulin 11/16/2021 2.8  gm/dL Final   • A/G Ratio 11/16/2021 1.5  g/dL Final   • BUN/Creatinine Ratio 11/16/2021 22.4  7.0 - 25.0 Final   • Anion Gap 11/16/2021 7.5  5.0 - 15.0 mmol/L Final   • TSH 11/16/2021 0.459  0.270 - 4.200 uIU/mL Final   • Free T4 11/16/2021 1.21  0.93 - 1.70 ng/dL Final    T4 results may be falsely increased if patient taking Biotin.   •  Iron 11/16/2021 255 (A) 37 - 145 mcg/dL Final   • Iron Saturation 11/16/2021 55 (A) 20 - 50 % Final   • Transferrin 11/16/2021 314  200 - 360 mg/dL Final   • TIBC 11/16/2021 468  298 - 536 mcg/dL Final   • Ferritin 11/16/2021 75.40  13.00 - 150.00 ng/mL Final   • Folate 11/16/2021 12.80  4.78 - 24.20 ng/mL Final   • Vitamin B-12 11/16/2021 341  211 - 946 pg/mL Final   • 25 Hydroxy, Vitamin D 11/16/2021 34.2  ng/ml Final       EKG Results:  No orders to display       Imaging Results:  No Images in the past 120 days found..      Assessment/Plan   Diagnoses and all orders for this visit:    1. Bipolar disorder, in full remission, most recent episode depressed (HCC) (Primary)  -     ARIPiprazole (Abilify) 2 MG tablet; Take 2 tab PO QD  Dispense: 60 tablet; Refill: 3  -     lamoTRIgine (LaMICtal) 100 MG tablet; Take 1 tablet by mouth Daily.  Dispense: 30 tablet; Refill: 3    2. Mixed obsessional thoughts and acts  -     ARIPiprazole (Abilify) 2 MG tablet; Take 2 tab PO QD  Dispense: 60 tablet; Refill: 3  -     lamoTRIgine (LaMICtal) 100 MG tablet; Take 1 tablet by mouth Daily.  Dispense: 30 tablet; Refill: 3    Presentation seems most consistent with bipolar disorder and anxiety, likely OCD.  We will continue Lamictal at current dose for management of bipolar, agitation, anxiety, OCD, and overall mood.  We will continue Abilify at current dose for management of bipolar, anxiety, and overall mood.  Follow-up in 3 months.  Addressed all questions and concerns.      Visit Diagnoses:    ICD-10-CM ICD-9-CM   1. Bipolar disorder, in full remission, most recent episode depressed (HCC)  F31.76 296.56   2. Mixed obsessional thoughts and acts  F42.2 300.3       PLAN:  1. Safety: No acute safety concerns at this time.  2. Therapy: Pt declines psychotherapy referral at this time.  3. Risk Assessment: Risk of self-harm acutely is moderate.  Risk factors include anxiety disorder, mood disorder, family history of suicide attempts,  history of self harm in the past, and recent psychosocial stressors (pandemic). Protective factors include denies access to guns/weapons, no present SI, no history of suicide attempts in the past, minimal AODA, healthcare seeking, future orientation, willingness to engage in care.  Risk of self-harm chronically is also moderate, but could be further elevated in the event of treatment noncompliance and/or AODA.  4. Labs/Diagnostics Ordered:   No orders of the defined types were placed in this encounter.    5. Medications:   New Medications Ordered This Visit   Medications   • ARIPiprazole (Abilify) 2 MG tablet     Sig: Take 2 tab PO QD     Dispense:  60 tablet     Refill:  3   • lamoTRIgine (LaMICtal) 100 MG tablet     Sig: Take 1 tablet by mouth Daily.     Dispense:  30 tablet     Refill:  3     Discussed all risks, benefits, alternatives, and side effects of Lamictal, including but not limited to rash, rebound depressive or manic symptoms if prompt discontinuation, GI upset, agitation, and sedation. Pt instructed to avoid driving and doing other tasks or actions that require to be alert until knowing how the drug affects them. Pt informed that birth control pills and other hormone-based birth control may not work as well to prevent pregnancy and advised to use some other kind of birth control, such as condoms, while taking this med. Discussed the need for pt to immediately call the office for any new or worsening symptoms, such as rash, worsening depression; feeling nervous or restless; suicidal thoughts or actions; or other changes changes in mood or behavior, and all other concerns. Pt educated on med compliance and the risks of suddenly stopping this medication or missing doses. Pt verbalized understanding and is agreeable to taking Lamictal. Addressed all questions and concerns.     Discussed all risks, benefits, alternatives, and side effects of Aripiprazole, including but not limited to GI upset, headache,  activating/sedating effects, dyslipidemia, extrapyramidal symptoms (dystonia, drug-induced parkinsonism, akathisia, tardive dyskinesia), lowering of seizure threshold, hematologic abnormalities, hyperglycemia, impulse control disorders, increased mortality in elderly patients with dementia-related psychosis, neuroleptic malignant syndrome, sexual dysfunction, orthostatic hypotension, falls risk in older adults, and temperature dysregulation. Pt instructed to avoid driving and doing other tasks or actions that require to be alert until knowing how the drug affects them.  Pt educated on the need to practice safe sex while taking this med. Discussed the need for pt to immediately call the office for any new or worsening symptoms, such as worsening depression; feeling nervous or restless; suicidal thoughts or actions; or other changes changes in mood or behavior, and all other concerns. Pt educated on med compliance and the risks of suddenly stopping this medication or missing doses. Pt verbalized understanding and is agreeable to taking Aripiprazole. Addressed all questions and concerns.       6. Follow up:   F/u in 3 months.    TREATMENT PLAN/GOALS: Continue supportive psychotherapy efforts and medications as indicated. Treatment and medication options discussed during today's visit. Patient ackowledged and verbally consented to continue with current treatment plan and was educated on the importance of compliance with treatment and follow-up appointments.    MEDICATION ISSUES:  WYATT reviewed as expected.  Discussed medication options and treatment plan of prescribed medication as well as the risks, benefits, and side effects including potential falls, possible impaired driving and metabolic adversities among others. Patient is agreeable to call the office with any worsening of symptoms or onset of side effects. Patient is agreeable to call 911 or go to the nearest ER should he/she begin having SI/HI. No medication  side effects or related complaints today.            This document has been electronically signed by Melyssa Dang PA-C  September 13, 2022 10:29 EDT      Part of this note may be an electronic transcription/translation of spoken language to printed text using the Dragon Dictation System.

## 2022-09-13 ENCOUNTER — TELEMEDICINE (OUTPATIENT)
Dept: BEHAVIORAL HEALTH | Facility: CLINIC | Age: 32
End: 2022-09-13

## 2022-09-13 DIAGNOSIS — F42.2 MIXED OBSESSIONAL THOUGHTS AND ACTS: ICD-10-CM

## 2022-09-13 DIAGNOSIS — F31.76 BIPOLAR DISORDER, IN FULL REMISSION, MOST RECENT EPISODE DEPRESSED: Primary | ICD-10-CM

## 2022-09-13 PROCEDURE — 99214 OFFICE O/P EST MOD 30 MIN: CPT | Performed by: PHYSICIAN ASSISTANT

## 2022-09-13 RX ORDER — ARIPIPRAZOLE 2 MG/1
TABLET ORAL
Qty: 60 TABLET | Refills: 3 | Status: SHIPPED | OUTPATIENT
Start: 2022-09-13 | End: 2022-11-01

## 2022-09-13 RX ORDER — LAMOTRIGINE 100 MG/1
100 TABLET ORAL DAILY
Qty: 30 TABLET | Refills: 3 | Status: SHIPPED | OUTPATIENT
Start: 2022-09-13 | End: 2022-12-12

## 2022-10-29 DIAGNOSIS — F42.2 MIXED OBSESSIONAL THOUGHTS AND ACTS: ICD-10-CM

## 2022-10-29 DIAGNOSIS — F31.76 BIPOLAR DISORDER, IN FULL REMISSION, MOST RECENT EPISODE DEPRESSED: ICD-10-CM

## 2022-11-01 RX ORDER — ARIPIPRAZOLE 2 MG/1
TABLET ORAL
Qty: 60 TABLET | Refills: 2 | Status: SHIPPED | OUTPATIENT
Start: 2022-11-01 | End: 2022-12-14 | Stop reason: SDUPTHER

## 2022-12-11 DIAGNOSIS — F42.2 MIXED OBSESSIONAL THOUGHTS AND ACTS: ICD-10-CM

## 2022-12-11 DIAGNOSIS — F31.76 BIPOLAR DISORDER, IN FULL REMISSION, MOST RECENT EPISODE DEPRESSED: ICD-10-CM

## 2022-12-12 RX ORDER — LAMOTRIGINE 100 MG/1
100 TABLET ORAL DAILY
Qty: 30 TABLET | Refills: 0 | Status: SHIPPED | OUTPATIENT
Start: 2022-12-12 | End: 2022-12-14 | Stop reason: SDUPTHER

## 2022-12-13 NOTE — PROGRESS NOTES
This provider is located at [address] [facility] [address], [address], [place] [address]. The Patient is seen remotely using [employer]. Patient is being seen via telehealth and confirm that they are in a secure environment for this session. The patient's condition being diagnosed/treated is appropriate for telemedicine. The provider identified herself as well as her credentials.   The patient gave consent to be seen remotely, and when consent is given they understand that the consent allows for patient identifiable information to be sent to a third party as needed.   They may refuse to be seen remotely at any time. The electronic data is encrypted and password protected, and the patient has been advised of the potential risks to privacy not withstanding such measures.    Virtual visit via Zoom audio and video due to the COVID-19 pandemic.  Patient is accepting of and agreeable to appointment.  The appointment consisted of the patient and I only.      Mode of visit: Video  Location of provider: [facility] [address], [place] [address].  Location of patient: Work  Does the patient consent to use a video/audio connection for your medical care today? Yes  The visit included audio and video interaction. No technical issues occurred during this visit.      Chief Complaint:  Anxiety     History of Present Illness: Mariam Lopes is a 32 y.o. female who presents to the office today for follow-up of mood.  Patient has been taking meds as prescribed and tolerating them well without any complications.  Pt thinks she may need to increase abilify to taking 2 tablets. Pt has been having relationship issues and lack of support from significant other. Pt reports she feels numb since she isn't letting herself feel her emotions or process the issues. No SI or HI. Pt has had an increase in anxiety and feels overwhelmed. She has had some irritability. No difficulty sleeping. No manic or hypomanic symptoms.    Medical  Record Review: Reviewed office visit ntoe from 11/16/21, pt seen for anxiety/depression, postpartum depression. She has managed her symptoms by smoking marijuana. S/p Zoloft in the past and that put her in the mental hospital. She does have bipolar disorder and in the past she has been prescribed Lamictal and that is the only thing that really seemed to help her.  S/p celexa, buspar, hydroxyzine. PHQ-9 score of 14 at visit. Pt denies having any thoughts of harming her baby.     Reviewed recent lab results from 11/16/21, CBC WNL (except MCV 99.8, immature grans 1.5%, abs immature grans 0.13), CMP WNL (except calcium 8.3), TSH and FT4 WNL      ROS:  Review of Systems   Constitutional: Negative for appetite change, diaphoresis, fatigue and unexpected weight change.   HENT: Negative for drooling, tinnitus and trouble swallowing.    Eyes: Negative for visual disturbance.   Respiratory: Negative for cough, chest tightness and shortness of breath.    Cardiovascular: Negative for chest pain, palpitations and leg swelling.   Gastrointestinal: Negative for abdominal pain, constipation, diarrhea, nausea and vomiting.   Endocrine: Negative for cold intolerance and heat intolerance.   Genitourinary: Negative for difficulty urinating.   Musculoskeletal: Negative for arthralgias, joint swelling and myalgias.   Skin: Negative for rash.   Allergic/Immunologic: Negative for immunocompromised state.   Neurological: Negative for dizziness, tremors, seizures, speech difficulty, numbness and headaches.   Psychiatric/Behavioral: Positive for agitation and dysphoric mood. Negative for hallucinations, self-injury, sleep disturbance and suicidal ideas. The patient is nervous/anxious.        Problem List:  Patient Active Problem List   Diagnosis   • Cervical cancer screening   • Gastroesophageal reflux disease   • Nausea   • Normal pregnancy in multigravida   • RhD negative   • Type A blood, Rh negative   • Underimmunization status  "      Current Medications:   Current Outpatient Medications   Medication Sig Dispense Refill   • ARIPiprazole (ABILIFY) 2 MG tablet Take 2 tablets by mouth Daily for 30 days. 60 tablet 1   • famotidine (PEPCID) 20 MG tablet      • lamoTRIgine (LaMICtal) 100 MG tablet Take 1 tablet by mouth Daily. 30 tablet 1     No current facility-administered medications for this visit.       Discontinued Medications:  Medications Discontinued During This Encounter   Medication Reason   • multivitamin with minerals tablet tablet *Therapy completed   • norelgestromin-ethinyl estradiol (ORTHO EVRA) 150-35 MCG/24HR *Therapy completed   • ARIPiprazole (ABILIFY) 2 MG tablet Reorder   • lamoTRIgine (LaMICtal) 100 MG tablet Reorder       Allergy:   Allergies   Allergen Reactions   • Erythromycin Rash   • Sulfa Antibiotics Rash        Past Medical History:  Past Medical History:   Diagnosis Date   • Anxiety    • Complication of pregnancy, childbirth and puerperium 2021    - History of broken ribs in prior pregnanany-so far no problems with this one.   • Depression    • Obsessive-compulsive disorder    • Panic disorder    • Self-injurious behavior    • Substance abuse (HCC)    • Withdrawal symptoms, drug or narcotic (HCC)        Past Surgical History:  Past Surgical History:   Procedure Laterality Date   • COLONOSCOPY     • D & C AND LAPAROSCOPY     • ENDOSCOPY     • TEAR DUCT SURGERY     • VAGINAL BIRTH AFTER  SECTION         Past Psychiatric History:  Began Treatment: Patient has been in therapy since seventh grade due to outbursts.   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   Therapist: Pt did therapy from the age in 7th grade until she was 18 years old.   Admission History: Patient was admitted when she was in high school to [facility] due to SI and HI, and increased agitation " after being started on Zoloft.  Medications/Treatment: Patient has been on Zoloft (SI, HI, increased agitation), Celexa, BuSpar, Seroquel (weight gain), guanfacine. Pt reports being on Lamictal in the past and worked well with controlling her symptoms. S/p hydroxyzine (increased agitation)  Self Harm: History of self-harm with cutting her arms and thighs, which she lasted in the seventh/eighth grade.  Patient does admit to hitting herself out of frustration at times.   Suicide Attempts: Denies    Family Psychiatric History:   Diagnoses: Her mother has history of bipolar disorder, anxiety, depression, OCD.  Her father has history of OCD.  Her sister has a history of anxiety, depression, OCD.  Her brother has a history of OCD.  Substance use: Her mother has a history of drug abuse, father has history of EtOH abuse.  Suicide Attempts/Completions: Her mother attempted suicide multiple times.  Patient denies any family history of suicide completions.    Family History   Problem Relation Age of Onset   • Mental illness Mother    • Anxiety disorder Mother    • Bipolar disorder Mother    • Depression Mother    • Drug abuse Mother    • OCD Mother    • Self-Injurious Behavior  Mother    • Suicide Attempts Mother    • Hypertension Father    • Alcohol abuse Father    • OCD Father    • Heart attack Paternal Uncle    • Heart attack Paternal Grandfather    • Anxiety disorder Sister    • Depression Sister    • OCD Sister    • OCD Brother    • Anxiety disorder Maternal Uncle    • Bipolar disorder Maternal Uncle    • Depression Maternal Uncle    • Drug abuse Maternal Uncle    • Anxiety disorder Maternal Grandmother    • Bipolar disorder Maternal Grandmother    • Depression Maternal Grandmother    • Drug abuse Cousin        Substance Abuse History:   Alcohol use: Occasional  Nicotine: Former  Illicit Drug Use: Patient reports history of methamphetamine use (smoking).  Last meth use was in 2017.  Patient admits to smoking  marijuana.  Longest Period Sober: Patient has been sober from methamphetamine and other drug use, except for marijuana, since 2017.  Rehab/AA/NA: Patient reports doing rehab twice.  Last rehab was for 14 months in house treatment program which she completed in 2018.    Social History:  Living Situation: Patient currently lives with her marino and 3-month-old daughter.  Her stepchildren will sometimes stay with her.  Her 5-year-old daughter will stay with her for about half of the week.  Marital/Relationship History: Patient has been with marino for 3 years.  Patient states this is a very supportive relationship and denies any kind of abuse.  Children: Patient has a 3-month-old daughter and a 5-year-old daughter.  Work History/Occupation: Patient works at [employer] and has been there for 3 years.  Education: Patient completed high school and some college.   History: Denies  Legal: Patient reports she is a convicted felon and has many arrests which have all been drug related.    Social History     Socioeconomic History   • Marital status: Single   Tobacco Use   • Smoking status: Every Day     Packs/day: 0.50     Types: Cigarettes     Start date: 2005   • Smokeless tobacco: Never   Vaping Use   • Vaping Use: Former   • Substances: Nicotine   Substance and Sexual Activity   • Alcohol use: Yes     Comment: occ   • Drug use: Not Currently     Types: Cocaine(coke), Methamphetamines, Heroin, Opium, Marijuana, PCP, LSD   • Sexual activity: Yes       Developmental History:   Place of birth: Patient was born in [place].  Siblings: 1 sister and 1 brother.  Childhood: Her parents have history of drug and alcohol abuse.  Otherwise she denies having any history of abuse or trauma.      Physical Exam:  Physical Exam    Appearance: appears to be of stated age, maintains good eye contact.   Behavior: Appropriate, cooperative. No acute distress.  Motor: No abnormal movements  Speech: Coherent and spontaneous speech.  "Normal reaction time to questions. No pressured speech.   Mood: \"I'm okay\"  Affect: Pt appears slightly depressed and is very briefly tearful when talking about relationship. Pt appears anxious.  Thought content: Negative suicidal ideations, negative homicidal ideations. Patient denies any obsession, compulsion, or phobia. No evidence of delusions.  Perceptions: Negative auditory hallucinations, negative visual hallucinations. Pt does not appear to be actively responding to internal stimuli.   Thought process: Logical, goal-directed, coherent, and linear with no evidence of flight of ideas, looseness of associations, thought blocking, circumstantiality.  Insight/Judgement: Fair/fair  Cognition: Alert and oriented to person, place, and date. Memory intact for recent and remote events. Attention and concentration intact.     Vital Signs:   There were no vitals taken for this visit.     Lab Results:   Office Visit on 03/31/2022   Component Date Value Ref Range Status   • Rapid Strep A Screen 03/31/2022 Negative  Negative, VALID, INVALID, Not Performed Final   • Internal Control 03/31/2022 Passed  Passed Final   • Lot Number 03/31/2022 [id number]   Final   • Expiration Date 03/31/2022 05/31/2023   Final       EKG Results:  No orders to display       Imaging Results:  No Images in the past 120 days found..      Assessment/Plan   Diagnoses and all orders for this visit:    1. Generalized anxiety disorder (Primary)  -     ARIPiprazole (ABILIFY) 2 MG tablet; Take 2 tablets by mouth Daily for 30 days.  Dispense: 60 tablet; Refill: 1  -     lamoTRIgine (LaMICtal) 100 MG tablet; Take 1 tablet by mouth Daily.  Dispense: 30 tablet; Refill: 1    2. Bipolar disorder, in full remission, most recent episode depressed (HCC)  -     ARIPiprazole (ABILIFY) 2 MG tablet; Take 2 tablets by mouth Daily for 30 days.  Dispense: 60 tablet; Refill: 1  -     lamoTRIgine (LaMICtal) 100 MG tablet; Take 1 tablet by mouth Daily.  Dispense: 30 tablet; " Refill: 1    3. Mixed obsessional thoughts and acts  -     ARIPiprazole (ABILIFY) 2 MG tablet; Take 2 tablets by mouth Daily for 30 days.  Dispense: 60 tablet; Refill: 1  -     lamoTRIgine (LaMICtal) 100 MG tablet; Take 1 tablet by mouth Daily.  Dispense: 30 tablet; Refill: 1    Presentation seems most consistent with bipolar disorder and anxiety, likely OCD.  We will continue Lamictal at current dose for management of bipolar, agitation, anxiety, OCD, and overall mood.  Will increase Abilify to 4mg for management of bipolar, anxiety, and overall mood.  Follow-up in 2 weeks. Recommended journaling, self care.  Addressed all questions and concerns.      Visit Diagnoses:    ICD-10-CM ICD-9-CM   1. Generalized anxiety disorder  F41.1 300.02   2. Bipolar disorder, in full remission, most recent episode depressed (HCC)  F31.76 296.56   3. Mixed obsessional thoughts and acts  F42.2 300.3       PLAN:  1. Safety: No acute safety concerns at this time.  2. Therapy: Pt declines psychotherapy referral at this time.  3. Risk Assessment: Risk of self-harm acutely is moderate.  Risk factors include anxiety disorder, mood disorder, family history of suicide attempts, history of self harm in the past, and recent psychosocial stressors (pandemic). Protective factors include denies access to guns/weapons, no present SI, no history of suicide attempts in the past, minimal AODA, healthcare seeking, future orientation, willingness to engage in care.  Risk of self-harm chronically is also moderate, but could be further elevated in the event of treatment noncompliance and/or AODA.  4. Labs/Diagnostics Ordered:   No orders of the defined types were placed in this encounter.    5. Medications:   New Medications Ordered This Visit   Medications   • ARIPiprazole (ABILIFY) 2 MG tablet     Sig: Take 2 tablets by mouth Daily for 30 days.     Dispense:  60 tablet     Refill:  1   • lamoTRIgine (LaMICtal) 100 MG tablet     Sig: Take 1 tablet by  mouth Daily.     Dispense:  30 tablet     Refill:  1     Discussed all risks, benefits, alternatives, and side effects of Lamictal, including but not limited to rash, rebound depressive or manic symptoms if prompt discontinuation, GI upset, agitation, and sedation. Pt instructed to avoid driving and doing other tasks or actions that require to be alert until knowing how the drug affects them. Pt informed that birth control pills and other hormone-based birth control may not work as well to prevent pregnancy and advised to use some other kind of birth control, such as condoms, while taking this med. Discussed the need for pt to immediately call the office for any new or worsening symptoms, such as rash, worsening depression; feeling nervous or restless; suicidal thoughts or actions; or other changes changes in mood or behavior, and all other concerns. Pt educated on med compliance and the risks of suddenly stopping this medication or missing doses. Pt verbalized understanding and is agreeable to taking Lamictal. Addressed all questions and concerns.     Discussed all risks, benefits, alternatives, and side effects of Aripiprazole, including but not limited to GI upset, headache, activating/sedating effects, dyslipidemia, extrapyramidal symptoms (dystonia, drug-induced parkinsonism, akathisia, tardive dyskinesia), lowering of seizure threshold, hematologic abnormalities, hyperglycemia, impulse control disorders, increased mortality in elderly patients with dementia-related psychosis, neuroleptic malignant syndrome, sexual dysfunction, orthostatic hypotension, falls risk in older adults, and temperature dysregulation. Pt instructed to avoid driving and doing other tasks or actions that require to be alert until knowing how the drug affects them.  Pt educated on the need to practice safe sex while taking this med. Discussed the need for pt to immediately call the office for any new or worsening symptoms, such as  worsening depression; feeling nervous or restless; suicidal thoughts or actions; or other changes changes in mood or behavior, and all other concerns. Pt educated on med compliance and the risks of suddenly stopping this medication or missing doses. Pt verbalized understanding and is agreeable to taking Aripiprazole. Addressed all questions and concerns.       6. Follow up:   F/u in 3 months.    TREATMENT PLAN/GOALS: Continue supportive psychotherapy efforts and medications as indicated. Treatment and medication options discussed during today's visit. Patient ackowledged and verbally consented to continue with current treatment plan and was educated on the importance of compliance with treatment and follow-up appointments.    MEDICATION ISSUES:  WYATT reviewed as expected.  Discussed medication options and treatment plan of prescribed medication as well as the risks, benefits, and side effects including potential falls, possible impaired driving and metabolic adversities among others. Patient is agreeable to call the office with any worsening of symptoms or onset of side effects. Patient is agreeable to call 911 or go to the nearest ER should he/she begin having SI/HI. No medication side effects or related complaints today.     18 minutes of supportive psychotherapy with goal to strengthen defenses, promote problems solving, restore adaptive functioning and provide symptom relief. The therapeutic alliance was strengthened to encourage the patient to express their thoughts and feelings. Esteem building was enhanced through praise, reassurance, normalizing and encouragement. Coping skills were enhanced using mindfulness (deep breathing, progressive muscle relaxation, imagery, grounding & meditation) and cognitive behavioral strategies (reviewing cognitive distortions, negative self-talk/thought stopping and cognitive restructuring, through de-catastrophizing and examining options/alternatives) to build distress  tolerance skills and emotional regulation.  Patient encouraged to practice negative thought stopping, in which the patient recognizes negative thoughts early and attempts to replace these thoughts with positive thoughts. Patient given education on medication side effects, diagnosis/illness and relapse symptoms. Plan to continue supportive psychotherapy in next appointment to provide symptom relief.        This document has been electronically signed by Melyssa Dang PA-C  December 14, 2022 09:02 EST      Part of this note may be an electronic transcription/translation of spoken language to printed text using the Dragon Dictation System.

## 2022-12-14 ENCOUNTER — TELEMEDICINE (OUTPATIENT)
Dept: PSYCHIATRY | Facility: CLINIC | Age: 32
End: 2022-12-14

## 2022-12-14 DIAGNOSIS — F41.1 GENERALIZED ANXIETY DISORDER: Primary | ICD-10-CM

## 2022-12-14 DIAGNOSIS — F42.2 MIXED OBSESSIONAL THOUGHTS AND ACTS: ICD-10-CM

## 2022-12-14 DIAGNOSIS — F31.76 BIPOLAR DISORDER, IN FULL REMISSION, MOST RECENT EPISODE DEPRESSED: ICD-10-CM

## 2022-12-14 PROCEDURE — 99214 OFFICE O/P EST MOD 30 MIN: CPT | Performed by: PHYSICIAN ASSISTANT

## 2022-12-14 PROCEDURE — 90833 PSYTX W PT W E/M 30 MIN: CPT | Performed by: PHYSICIAN ASSISTANT

## 2022-12-14 RX ORDER — LAMOTRIGINE 100 MG/1
100 TABLET ORAL DAILY
Qty: 30 TABLET | Refills: 1 | Status: SHIPPED | OUTPATIENT
Start: 2022-12-14 | End: 2023-01-31 | Stop reason: SDUPTHER

## 2022-12-14 RX ORDER — ARIPIPRAZOLE 2 MG/1
4 TABLET ORAL DAILY
Qty: 60 TABLET | Refills: 1 | Status: SHIPPED | OUTPATIENT
Start: 2022-12-14 | End: 2023-01-13

## 2023-01-31 ENCOUNTER — TELEMEDICINE (OUTPATIENT)
Dept: BEHAVIORAL HEALTH | Facility: CLINIC | Age: 33
End: 2023-01-31
Payer: COMMERCIAL

## 2023-01-31 DIAGNOSIS — F31.76 BIPOLAR DISORDER, IN FULL REMISSION, MOST RECENT EPISODE DEPRESSED: ICD-10-CM

## 2023-01-31 DIAGNOSIS — F42.2 MIXED OBSESSIONAL THOUGHTS AND ACTS: ICD-10-CM

## 2023-01-31 DIAGNOSIS — F41.1 GENERALIZED ANXIETY DISORDER: Primary | ICD-10-CM

## 2023-01-31 PROCEDURE — 99213 OFFICE O/P EST LOW 20 MIN: CPT | Performed by: PHYSICIAN ASSISTANT

## 2023-01-31 RX ORDER — ARIPIPRAZOLE 2 MG/1
4 TABLET ORAL DAILY
Qty: 60 TABLET | Refills: 2 | Status: SHIPPED | OUTPATIENT
Start: 2023-01-31 | End: 2023-03-02

## 2023-01-31 RX ORDER — LAMOTRIGINE 100 MG/1
100 TABLET ORAL DAILY
Qty: 30 TABLET | Refills: 2 | Status: SHIPPED | OUTPATIENT
Start: 2023-01-31 | End: 2023-03-28 | Stop reason: SDUPTHER

## 2023-01-31 NOTE — PROGRESS NOTES
This provider is located at [address] [facility] [address], [address], [place] [address]. The Patient is seen remotely using [employer]. Patient is being seen via telehealth and confirm that they are in a secure environment for this session. The patient's condition being diagnosed/treated is appropriate for telemedicine. The provider identified herself as well as her credentials.   The patient gave consent to be seen remotely, and when consent is given they understand that the consent allows for patient identifiable information to be sent to a third party as needed.   They may refuse to be seen remotely at any time. The electronic data is encrypted and password protected, and the patient has been advised of the potential risks to privacy not withstanding such measures.    Virtual visit via Zoom audio and video due to the COVID-19 pandemic.  Patient is accepting of and agreeable to appointment.  The appointment consisted of the patient and I only.      Mode of visit: Video  Location of provider: [facility] [address], [place] [address].  Location of patient: home  Does the patient consent to use a video/audio connection for your medical care today? Yes  The visit included audio and video interaction. No technical issues occurred during this visit.      Chief Complaint:  Anxiety     History of Present Illness: Mariam Lopes is a 32 y.o. female who presents today for follow-up of mood.  Patient has been taking meds as prescribed and tolerating them well without any complications.  Pt denies feeling depressed. No SI or HI. No difficulty sleeping. Pt has had some anxiety due to situational stressors with significant other moving back in. No panic attacks. She denies feeling irritable. No symptoms of melany or hypomania. Pt feels like mood is controlled at this time.    Medical Record Review: Reviewed office visit ntoe from 11/16/21, pt seen for anxiety/depression, postpartum depression. She has managed her  symptoms by smoking marijuana. S/p Zoloft in the past and that put her in the mental hospital. She does have bipolar disorder and in the past she has been prescribed Lamictal and that is the only thing that really seemed to help her.  S/p celexa, buspar, hydroxyzine. PHQ-9 score of 14 at visit. Pt denies having any thoughts of harming her baby.     Reviewed recent lab results from 11/16/21, CBC WNL (except MCV 99.8, immature grans 1.5%, abs immature grans 0.13), CMP WNL (except calcium 8.3), TSH and FT4 WNL      ROS:  Review of Systems   Constitutional: Negative for appetite change, diaphoresis, fatigue and unexpected weight change.   HENT: Negative for drooling, tinnitus and trouble swallowing.    Eyes: Negative for visual disturbance.   Respiratory: Negative for cough, chest tightness and shortness of breath.    Cardiovascular: Negative for chest pain, palpitations and leg swelling.   Gastrointestinal: Negative for abdominal pain, constipation, diarrhea, nausea and vomiting.   Endocrine: Negative for cold intolerance and heat intolerance.   Genitourinary: Negative for difficulty urinating.   Musculoskeletal: Negative for arthralgias, joint swelling and myalgias.   Skin: Negative for rash.   Allergic/Immunologic: Negative for immunocompromised state.   Neurological: Negative for dizziness, tremors, seizures, speech difficulty, numbness and headaches.   Psychiatric/Behavioral: Negative for agitation, dysphoric mood, hallucinations, self-injury, sleep disturbance and suicidal ideas. The patient is nervous/anxious.        Problem List:  Patient Active Problem List   Diagnosis   • Cervical cancer screening   • Gastroesophageal reflux disease   • Nausea   • Normal pregnancy in multigravida   • RhD negative   • Type A blood, Rh negative   • Underimmunization status       Current Medications:   Current Outpatient Medications   Medication Sig Dispense Refill   • famotidine (PEPCID) 20 MG tablet      • lamoTRIgine  "(LaMICtal) 100 MG tablet Take 1 tablet by mouth Daily. 30 tablet 2   • ARIPiprazole (Abilify) 2 MG tablet Take 2 tablets by mouth Daily for 30 days. 60 tablet 2     No current facility-administered medications for this visit.       Discontinued Medications:  Medications Discontinued During This Encounter   Medication Reason   • lamoTRIgine (LaMICtal) 100 MG tablet Reorder       Allergy:   Allergies   Allergen Reactions   • Erythromycin Rash   • Sulfa Antibiotics Rash        Past Medical History:  Past Medical History:   Diagnosis Date   • Anxiety    • Complication of pregnancy, childbirth and puerperium 2021    - History of broken ribs in prior pregnanany-so far no problems with this one.   • Depression    • Obsessive-compulsive disorder    • Panic disorder    • Self-injurious behavior    • Substance abuse (HCC)    • Withdrawal symptoms, drug or narcotic (HCC)        Past Surgical History:  Past Surgical History:   Procedure Laterality Date   • COLONOSCOPY     • D & C AND LAPAROSCOPY     • ENDOSCOPY     • TEAR DUCT SURGERY     • VAGINAL BIRTH AFTER  SECTION         Past Psychiatric History:  Began Treatment: Patient has been in therapy since seventh grade due to outbursts.   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   Therapist: Pt did therapy from the age in 7th grade until she was 18 years old.   Admission History: Patient was admitted when she was in high school to [facility] due to SI and HI, and increased agitation after being started on Zoloft.  Medications/Treatment: Patient has been on Zoloft (SI, HI, increased agitation), Celexa, BuSpar, Seroquel (weight gain), guanfacine. Pt reports being on Lamictal in the past and worked well with controlling her symptoms. S/p hydroxyzine (increased agitation)  Self Harm: History of self-harm with cutting her arms and " thighs, which she lasted in the seventh/eighth grade.  Patient does admit to hitting herself out of frustration at times.   Suicide Attempts: Denies    Family Psychiatric History:   Diagnoses: Her mother has history of bipolar disorder, anxiety, depression, OCD.  Her father has history of OCD.  Her sister has a history of anxiety, depression, OCD.  Her brother has a history of OCD.  Substance use: Her mother has a history of drug abuse, father has history of EtOH abuse.  Suicide Attempts/Completions: Her mother attempted suicide multiple times.  Patient denies any family history of suicide completions.    Family History   Problem Relation Age of Onset   • Mental illness Mother    • Anxiety disorder Mother    • Bipolar disorder Mother    • Depression Mother    • Drug abuse Mother    • OCD Mother    • Self-Injurious Behavior  Mother    • Suicide Attempts Mother    • Hypertension Father    • Alcohol abuse Father    • OCD Father    • Heart attack Paternal Uncle    • Heart attack Paternal Grandfather    • Anxiety disorder Sister    • Depression Sister    • OCD Sister    • OCD Brother    • Anxiety disorder Maternal Uncle    • Bipolar disorder Maternal Uncle    • Depression Maternal Uncle    • Drug abuse Maternal Uncle    • Anxiety disorder Maternal Grandmother    • Bipolar disorder Maternal Grandmother    • Depression Maternal Grandmother    • Drug abuse Cousin        Substance Abuse History:   Alcohol use: Occasional  Nicotine: Former  Illicit Drug Use: Patient reports history of methamphetamine use (smoking).  Last meth use was in 2017.  Patient admits to smoking marijuana.  Longest Period Sober: Patient has been sober from methamphetamine and other drug use, except for marijuana, since 2017.  Rehab/AA/NA: Patient reports doing rehab twice.  Last rehab was for 14 months in house treatment program which she completed in 2018.    Social History:  Living Situation: Patient currently lives with her fiancé and 3-month-old  "daughter.  Her stepchildren will sometimes stay with her.  Her 5-year-old daughter will stay with her for about half of the week.  Marital/Relationship History: Patient has been with marino for 3 years.  Patient states this is a very supportive relationship and denies any kind of abuse.  Children: Patient has a 3-month-old daughter and a 5-year-old daughter.  Work History/Occupation: Patient works at [employer] and has been there for 3 years.  Education: Patient completed high school and some college.   History: Denies  Legal: Patient reports she is a convicted felon and has many arrests which have all been drug related.    Social History     Socioeconomic History   • Marital status: Single   Tobacco Use   • Smoking status: Former     Packs/day: 0.50     Types: Cigarettes     Start date:      Quit date: 2023     Years since quittin.0   • Smokeless tobacco: Never   Vaping Use   • Vaping Use: Every day   • Substances: Nicotine, THC   • Devices: Disposable   Substance and Sexual Activity   • Alcohol use: Yes     Comment: occ   • Drug use: Not Currently     Types: Cocaine(coke), Methamphetamines, Heroin, Opium, Marijuana, PCP, LSD   • Sexual activity: Yes       Developmental History:   Place of birth: Patient was born in [place].  Siblings: 1 sister and 1 brother.  Childhood: Her parents have history of drug and alcohol abuse.  Otherwise she denies having any history of abuse or trauma.      Physical Exam:  Physical Exam    Appearance: appears to be of stated age, maintains good eye contact.   Behavior: Appropriate, cooperative. No acute distress.  Motor: No abnormal movements  Speech: Coherent and spontaneous speech. Normal reaction time to questions. No pressured speech.   Mood: \"I'm good\"  Affect: Pt does not appear depressed or anxious.  Thought content: Negative suicidal ideations, negative homicidal ideations. Patient denies any obsession, compulsion, or phobia. No evidence of " delusions.  Perceptions: Negative auditory hallucinations, negative visual hallucinations. Pt does not appear to be actively responding to internal stimuli.   Thought process: Logical, goal-directed, coherent, and linear with no evidence of flight of ideas, looseness of associations, thought blocking, circumstantiality.  Insight/Judgement: Fair/fair  Cognition: Alert and oriented to person, place, and date. Memory intact for recent and remote events. Attention and concentration intact.     Vital Signs:   There were no vitals taken for this visit.     Lab Results:   Office Visit on 03/31/2022   Component Date Value Ref Range Status   • Rapid Strep A Screen 03/31/2022 Negative  Negative, VALID, INVALID, Not Performed Final   • Internal Control 03/31/2022 Passed  Passed Final   • Lot Number 03/31/2022 [id number]   Final   • Expiration Date 03/31/2022 05/31/2023   Final       EKG Results:  No orders to display       Imaging Results:  No Images in the past 120 days found..      Assessment/Plan   Diagnoses and all orders for this visit:    1. Generalized anxiety disorder (Primary)  -     ARIPiprazole (Abilify) 2 MG tablet; Take 2 tablets by mouth Daily for 30 days.  Dispense: 60 tablet; Refill: 2  -     lamoTRIgine (LaMICtal) 100 MG tablet; Take 1 tablet by mouth Daily.  Dispense: 30 tablet; Refill: 2    2. Bipolar disorder, in full remission, most recent episode depressed (HCC)  -     ARIPiprazole (Abilify) 2 MG tablet; Take 2 tablets by mouth Daily for 30 days.  Dispense: 60 tablet; Refill: 2  -     lamoTRIgine (LaMICtal) 100 MG tablet; Take 1 tablet by mouth Daily.  Dispense: 30 tablet; Refill: 2    3. Mixed obsessional thoughts and acts  -     ARIPiprazole (Abilify) 2 MG tablet; Take 2 tablets by mouth Daily for 30 days.  Dispense: 60 tablet; Refill: 2  -     lamoTRIgine (LaMICtal) 100 MG tablet; Take 1 tablet by mouth Daily.  Dispense: 30 tablet; Refill: 2    Presentation seems most consistent with bipolar disorder and  anxiety, likely OCD.  We will continue Lamictal at current dose for management of bipolar, agitation, anxiety, OCD, and overall mood.  Will continue Abilify 4mg for management of bipolar, anxiety, and overall mood.  Follow-up in 2 months. Recommended journaling, self care.  Addressed all questions and concerns.      Visit Diagnoses:    ICD-10-CM ICD-9-CM   1. Generalized anxiety disorder  F41.1 300.02   2. Bipolar disorder, in full remission, most recent episode depressed (HCC)  F31.76 296.56   3. Mixed obsessional thoughts and acts  F42.2 300.3       PLAN:  1. Safety: No acute safety concerns at this time.  2. Therapy: Pt declines psychotherapy referral at this time.  3. Risk Assessment: Risk of self-harm acutely is moderate.  Risk factors include anxiety disorder, mood disorder, family history of suicide attempts, history of self harm in the past, and recent psychosocial stressors (pandemic). Protective factors include denies access to guns/weapons, no present SI, no history of suicide attempts in the past, minimal AODA, healthcare seeking, future orientation, willingness to engage in care.  Risk of self-harm chronically is also moderate, but could be further elevated in the event of treatment noncompliance and/or AODA.  4. Labs/Diagnostics Ordered:   No orders of the defined types were placed in this encounter.    5. Medications:   New Medications Ordered This Visit   Medications   • ARIPiprazole (Abilify) 2 MG tablet     Sig: Take 2 tablets by mouth Daily for 30 days.     Dispense:  60 tablet     Refill:  2   • lamoTRIgine (LaMICtal) 100 MG tablet     Sig: Take 1 tablet by mouth Daily.     Dispense:  30 tablet     Refill:  2     Discussed all risks, benefits, alternatives, and side effects of Lamictal, including but not limited to rash, rebound depressive or manic symptoms if prompt discontinuation, GI upset, agitation, and sedation. Pt instructed to avoid driving and doing other tasks or actions that require to  be alert until knowing how the drug affects them. Pt informed that birth control pills and other hormone-based birth control may not work as well to prevent pregnancy and advised to use some other kind of birth control, such as condoms, while taking this med. Discussed the need for pt to immediately call the office for any new or worsening symptoms, such as rash, worsening depression; feeling nervous or restless; suicidal thoughts or actions; or other changes changes in mood or behavior, and all other concerns. Pt educated on med compliance and the risks of suddenly stopping this medication or missing doses. Pt verbalized understanding and is agreeable to taking Lamictal. Addressed all questions and concerns.     Discussed all risks, benefits, alternatives, and side effects of Aripiprazole, including but not limited to GI upset, headache, activating/sedating effects, dyslipidemia, extrapyramidal symptoms (dystonia, drug-induced parkinsonism, akathisia, tardive dyskinesia), lowering of seizure threshold, hematologic abnormalities, hyperglycemia, impulse control disorders, increased mortality in elderly patients with dementia-related psychosis, neuroleptic malignant syndrome, sexual dysfunction, orthostatic hypotension, falls risk in older adults, and temperature dysregulation. Pt instructed to avoid driving and doing other tasks or actions that require to be alert until knowing how the drug affects them.  Pt educated on the need to practice safe sex while taking this med. Discussed the need for pt to immediately call the office for any new or worsening symptoms, such as worsening depression; feeling nervous or restless; suicidal thoughts or actions; or other changes changes in mood or behavior, and all other concerns. Pt educated on med compliance and the risks of suddenly stopping this medication or missing doses. Pt verbalized understanding and is agreeable to taking Aripiprazole. Addressed all questions and  concerns.       6. Follow up:   F/u in 2 months.    TREATMENT PLAN/GOALS: Continue supportive psychotherapy efforts and medications as indicated. Treatment and medication options discussed during today's visit. Patient ackowledged and verbally consented to continue with current treatment plan and was educated on the importance of compliance with treatment and follow-up appointments.    MEDICATION ISSUES:  WYATT reviewed as expected.  Discussed medication options and treatment plan of prescribed medication as well as the risks, benefits, and side effects including potential falls, possible impaired driving and metabolic adversities among others. Patient is agreeable to call the office with any worsening of symptoms or onset of side effects. Patient is agreeable to call 911 or go to the nearest ER should he/she begin having SI/HI. No medication side effects or related complaints today.        This document has been electronically signed by Melyssa Dang PA-C  January 31, 2023 10:56 EST      Part of this note may be an electronic transcription/translation of spoken language to printed text using the Dragon Dictation System.

## 2023-03-23 ENCOUNTER — OFFICE VISIT (OUTPATIENT)
Dept: FAMILY MEDICINE CLINIC | Facility: CLINIC | Age: 33
End: 2023-03-23
Payer: COMMERCIAL

## 2023-03-23 VITALS
HEART RATE: 114 BPM | WEIGHT: 148 LBS | SYSTOLIC BLOOD PRESSURE: 106 MMHG | DIASTOLIC BLOOD PRESSURE: 62 MMHG | TEMPERATURE: 97.1 F | BODY MASS INDEX: 26.22 KG/M2 | OXYGEN SATURATION: 99 %

## 2023-03-23 DIAGNOSIS — Z30.8 ENCOUNTER FOR OTHER CONTRACEPTIVE MANAGEMENT: Primary | ICD-10-CM

## 2023-03-23 LAB
B-HCG UR QL: NEGATIVE
EXPIRATION DATE: NORMAL
INTERNAL NEGATIVE CONTROL: NORMAL
INTERNAL POSITIVE CONTROL: NORMAL
Lab: NORMAL

## 2023-03-23 PROCEDURE — 81025 URINE PREGNANCY TEST: CPT | Performed by: NURSE PRACTITIONER

## 2023-03-23 PROCEDURE — 99213 OFFICE O/P EST LOW 20 MIN: CPT | Performed by: NURSE PRACTITIONER

## 2023-03-23 RX ORDER — ARIPIPRAZOLE 5 MG/1
5 TABLET ORAL DAILY
COMMUNITY
End: 2023-03-28

## 2023-03-23 NOTE — PROGRESS NOTES
Chief Complaint  Contraception (Wants b/c )    Subjective            Mariam Lopes presents to [facility]  History of Present Illness     Mariam presents to the office today for contraceptive management - she was previously using the Ortho Evra patch.     She has two children - a 7 year old daughter, and a 19 month old daughter.     She wants to go back on the patch for contraception. The first day of her LMP was approx. 22 days ago. She is due in 6 days. She keeps track via an darling on her watch and she is consistently every 28 days. Her periods are not bad -denies menorrhagia or dysmenorrhea, and they only last 4 days.     She is a smoker. She quit cigarettes about a month and a half ago. She does vape now - vaping nicotine. She isn't sure how much she actually vapes. She denies any history of clotting d/o, migraine with aura, or seizure d/o.       Past Medical History:   Diagnosis Date   • Anxiety    • Complication of pregnancy, childbirth and puerperium 2021    - History of broken ribs in prior pregnanany-so far no problems with this one.   • Depression    • Obsessive-compulsive disorder    • Panic disorder    • Self-injurious behavior    • Substance abuse (HCC)    • Withdrawal symptoms, drug or narcotic (HCC)        Allergies   Allergen Reactions   • Erythromycin Rash   • Sulfa Antibiotics Rash        Past Surgical History:   Procedure Laterality Date   • COLONOSCOPY     • D & C AND LAPAROSCOPY     • ENDOSCOPY     • TEAR DUCT SURGERY     • VAGINAL BIRTH AFTER  SECTION          Social History     Tobacco Use   • Smoking status: Former     Packs/day: 0.50     Years: 18.00     Pack years: 9.00     Types: Cigarettes     Start date:      Quit date: 2023     Years since quittin.1   • Smokeless tobacco: Never   Vaping Use   • Vaping Use: Every day   • Substances: Nicotine, THC   • Devices: Disposable   Substance Use Topics   • Alcohol use: Yes     Comment:  occ   • Drug use: Not Currently     Types: Cocaine(coke), Methamphetamines, Heroin, Opium, Marijuana, PCP, LSD       Family History   Problem Relation Age of Onset   • Mental illness Mother    • Anxiety disorder Mother    • Bipolar disorder Mother    • Depression Mother    • Drug abuse Mother    • OCD Mother    • Self-Injurious Behavior  Mother    • Suicide Attempts Mother    • Hypertension Father    • Alcohol abuse Father    • OCD Father    • Heart attack Paternal Uncle    • Heart attack Paternal Grandfather    • Anxiety disorder Sister    • Depression Sister    • OCD Sister    • OCD Brother    • Anxiety disorder Maternal Uncle    • Bipolar disorder Maternal Uncle    • Depression Maternal Uncle    • Drug abuse Maternal Uncle    • Anxiety disorder Maternal Grandmother    • Bipolar disorder Maternal Grandmother    • Depression Maternal Grandmother    • Drug abuse Cousin         Health Maintenance Due   Topic Date Due   • COVID-19 Vaccine (1) Never done   • HEPATITIS C SCREENING  Never done   • ANNUAL PHYSICAL  Never done   • PAP SMEAR  Never done   • INFLUENZA VACCINE  Never done        Current Outpatient Medications on File Prior to Visit   Medication Sig   • ARIPiprazole (ABILIFY) 5 MG tablet Take 1 tablet by mouth Daily.   • lamoTRIgine (LaMICtal) 100 MG tablet Take 1 tablet by mouth Daily.   • [DISCONTINUED] famotidine (PEPCID) 20 MG tablet  (Patient not taking: Reported on 3/23/2023)     No current facility-administered medications on file prior to visit.       Immunization History   Administered Date(s) Administered   • DTaP, Unspecified 07/15/1996   • MMR 07/15/1996, 08/23/2021   • OPV 07/15/1996   • Rho (D) Immune Globulin 06/16/2021   • Tdap 07/27/2017, 06/16/2021       Review of Systems     Objective     /62   Pulse 114   Temp 97.1 °F (36.2 °C)   Wt 67.1 kg (148 lb)   SpO2 99%   BMI 26.22 kg/m²       Physical Exam  Vitals reviewed.   Constitutional:       General: She is not in acute distress.      Appearance: She is well-developed and overweight.   HENT:      Head: Normocephalic and atraumatic.   Eyes:      General: No scleral icterus.     Extraocular Movements: Extraocular movements intact.      Conjunctiva/sclera: Conjunctivae normal.   Cardiovascular:      Rate and Rhythm: Normal rate and regular rhythm.      Pulses: Normal pulses.      Heart sounds: No murmur heard.  Pulmonary:      Effort: Pulmonary effort is normal. No respiratory distress.      Breath sounds: Normal breath sounds. No wheezing, rhonchi or rales.   Musculoskeletal:         General: Normal range of motion.      Cervical back: Normal range of motion.      Right lower leg: No edema.      Left lower leg: No edema.   Skin:     General: Skin is warm and dry.   Neurological:      Mental Status: She is alert and oriented to person, place, and time.   Psychiatric:         Mood and Affect: Mood and affect normal.         Behavior: Behavior normal.         Thought Content: Thought content normal.         Judgment: Judgment normal.         Result Review :     The following data was reviewed by: CRISTELA Padron on 03/23/2023:    Pap smear from 2020 at the [facility] showing ASCUS -reviewed via care everywhere  POCT pregnancy, urine (03/23/2023 10:00)                  Assessment and Plan      Diagnoses and all orders for this visit:    1. Encounter for other contraceptive management (Primary)  -     POCT pregnancy, urine  -     norelgestromin-ethinyl estradiol (ORTHO EVRA) 150-35 MCG/24HR; Place 1 patch on the skin as directed by provider 1 (One) Time Per Week.  Dispense: 3 patch; Refill: 12            Follow Up     Return for Annual physical/WWE.     I will refill her birth control today.  She is due to start her period in 6 days.  Sunday start recommended.  Urine pregnancy test in the office today is negative.    We discussed her abnormal Pap smear from 2020.  HPV testing not available.  She will follow-up with me in the  near future for well woman exam/Pap smear.    Patient was given instructions and counseling regarding her condition or for health maintenance advice. Please see specific information pulled into the AVS if appropriate.

## 2023-03-28 ENCOUNTER — TELEMEDICINE (OUTPATIENT)
Dept: BEHAVIORAL HEALTH | Facility: CLINIC | Age: 33
End: 2023-03-28
Payer: COMMERCIAL

## 2023-03-28 DIAGNOSIS — F41.1 GENERALIZED ANXIETY DISORDER: ICD-10-CM

## 2023-03-28 DIAGNOSIS — F31.76 BIPOLAR DISORDER, IN FULL REMISSION, MOST RECENT EPISODE DEPRESSED: ICD-10-CM

## 2023-03-28 DIAGNOSIS — F42.2 MIXED OBSESSIONAL THOUGHTS AND ACTS: ICD-10-CM

## 2023-03-28 PROCEDURE — 1159F MED LIST DOCD IN RCRD: CPT | Performed by: PHYSICIAN ASSISTANT

## 2023-03-28 PROCEDURE — 1160F RVW MEDS BY RX/DR IN RCRD: CPT | Performed by: PHYSICIAN ASSISTANT

## 2023-03-28 PROCEDURE — 99213 OFFICE O/P EST LOW 20 MIN: CPT | Performed by: PHYSICIAN ASSISTANT

## 2023-03-28 RX ORDER — LAMOTRIGINE 100 MG/1
100 TABLET ORAL DAILY
Qty: 90 TABLET | Refills: 1 | Status: SHIPPED | OUTPATIENT
Start: 2023-03-28 | End: 2023-06-26

## 2023-03-28 RX ORDER — ARIPIPRAZOLE 2 MG/1
4 TABLET ORAL DAILY
Qty: 180 TABLET | Refills: 1 | Status: SHIPPED | OUTPATIENT
Start: 2023-03-28 | End: 2023-06-26

## 2023-03-28 NOTE — PROGRESS NOTES
This provider is located at [address] [facility] [address], [address], [place] [address]. The Patient is seen remotely using [employer]. Patient is being seen via telehealth and confirm that they are in a secure environment for this session. The patient's condition being diagnosed/treated is appropriate for telemedicine. The provider identified herself as well as her credentials.   The patient gave consent to be seen remotely, and when consent is given they understand that the consent allows for patient identifiable information to be sent to a third party as needed.   They may refuse to be seen remotely at any time. The electronic data is encrypted and password protected, and the patient has been advised of the potential risks to privacy not withstanding such measures.    Virtual visit via Zoom audio and video due to the COVID-19 pandemic.  Patient is accepting of and agreeable to appointment.  The appointment consisted of the patient and I only.      Mode of visit: Video  Location of provider: [facility] [address], [place] [address].  Location of patient: work  Does the patient consent to use a video/audio connection for your medical care today? Yes  The visit included audio and video interaction. No technical issues occurred during this visit.      Chief Complaint:  Anxiety     History of Present Illness: Mariam Lopes is a 32 y.o. female who presents today for follow-up of mood.  Patient has been taking meds as prescribed and tolerating them well without any complications.  Pt denies feeling depressed. No SI or HI. No difficulty sleeping. Pt has had some anxiety due to situational stressors with her significant other, but feels like it has been manageable.  She denies feeling irritable or on edge.  No panic attacks.  No symptoms of hypomania or melany.  Patient reports mood has been stable.    Medical Record Review: Reviewed office visit ntoe from 11/16/21, pt seen for anxiety/depression,  postpartum depression. She has managed her symptoms by smoking marijuana. S/p Zoloft in the past and that put her in the mental hospital. She does have bipolar disorder and in the past she has been prescribed Lamictal and that is the only thing that really seemed to help her.  S/p celexa, buspar, hydroxyzine. PHQ-9 score of 14 at visit. Pt denies having any thoughts of harming her baby.     Reviewed recent lab results from 11/16/21, CBC WNL (except MCV 99.8, immature grans 1.5%, abs immature grans 0.13), CMP WNL (except calcium 8.3), TSH and FT4 WNL      ROS:  Review of Systems   Constitutional: Negative for appetite change, diaphoresis, fatigue and unexpected weight change.   HENT: Negative for drooling, tinnitus and trouble swallowing.    Eyes: Negative for visual disturbance.   Respiratory: Negative for cough, chest tightness and shortness of breath.    Cardiovascular: Negative for chest pain, palpitations and leg swelling.   Gastrointestinal: Negative for abdominal pain, constipation, diarrhea, nausea and vomiting.   Endocrine: Negative for cold intolerance and heat intolerance.   Genitourinary: Negative for difficulty urinating.   Musculoskeletal: Negative for arthralgias, joint swelling and myalgias.   Skin: Negative for rash.   Allergic/Immunologic: Negative for immunocompromised state.   Neurological: Negative for dizziness, tremors, seizures, speech difficulty, numbness and headaches.   Psychiatric/Behavioral: Negative for agitation, dysphoric mood, hallucinations, self-injury, sleep disturbance and suicidal ideas. The patient is nervous/anxious.        Problem List:  Patient Active Problem List   Diagnosis   • Cervical cancer screening   • Gastroesophageal reflux disease   • Nausea   • Normal pregnancy in multigravida   • RhD negative   • Type A blood, Rh negative   • Underimmunization status       Current Medications:   Current Outpatient Medications   Medication Sig Dispense Refill   • lamoTRIgine  "(LaMICtal) 100 MG tablet Take 1 tablet by mouth Daily for 90 days. 90 tablet 1   • ARIPiprazole (Abilify) 2 MG tablet Take 2 tablets by mouth Daily for 90 days. 180 tablet 1   • norelgestromin-ethinyl estradiol (ORTHO EVRA) 150-35 MCG/24HR Place 1 patch on the skin as directed by provider 1 (One) Time Per Week. 3 patch 12     No current facility-administered medications for this visit.       Discontinued Medications:  Medications Discontinued During This Encounter   Medication Reason   • ARIPiprazole (ABILIFY) 5 MG tablet    • lamoTRIgine (LaMICtal) 100 MG tablet Reorder       Allergy:   Allergies   Allergen Reactions   • Erythromycin Rash   • Sulfa Antibiotics Rash        Past Medical History:  Past Medical History:   Diagnosis Date   • Anxiety    • Complication of pregnancy, childbirth and puerperium 2021    - History of broken ribs in prior pregnanany-so far no problems with this one.   • Depression    • Obsessive-compulsive disorder    • Panic disorder    • Self-injurious behavior    • Substance abuse (HCC)    • Withdrawal symptoms, drug or narcotic (HCC)        Past Surgical History:  Past Surgical History:   Procedure Laterality Date   • COLONOSCOPY     • D & C AND LAPAROSCOPY     • ENDOSCOPY     • TEAR DUCT SURGERY     • VAGINAL BIRTH AFTER  SECTION         Past Psychiatric History:  Began Treatment: Patient has been in therapy since seventh grade due to outbursts.   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   Therapist: Pt did therapy from the age in 7th grade until she was 18 years old.   Admission History: Patient was admitted when she was in high school to [facility] due to SI and HI, and increased agitation after being started on Zoloft.  Medications/Treatment: Patient has been on Zoloft (SI, HI, increased agitation), Celexa, BuSpar, Seroquel (weight " gain), guanfacine. Pt reports being on Lamictal in the past and worked well with controlling her symptoms. S/p hydroxyzine (increased agitation)  Self Harm: History of self-harm with cutting her arms and thighs, which she lasted in the seventh/eighth grade.  Patient does admit to hitting herself out of frustration at times.   Suicide Attempts: Denies    Family Psychiatric History:   Diagnoses: Her mother has history of bipolar disorder, anxiety, depression, OCD.  Her father has history of OCD.  Her sister has a history of anxiety, depression, OCD.  Her brother has a history of OCD.  Substance use: Her mother has a history of drug abuse, father has history of EtOH abuse.  Suicide Attempts/Completions: Her mother attempted suicide multiple times.  Patient denies any family history of suicide completions.    Family History   Problem Relation Age of Onset   • Mental illness Mother    • Anxiety disorder Mother    • Bipolar disorder Mother    • Depression Mother    • Drug abuse Mother    • OCD Mother    • Self-Injurious Behavior  Mother    • Suicide Attempts Mother    • Hypertension Father    • Alcohol abuse Father    • OCD Father    • Heart attack Paternal Uncle    • Heart attack Paternal Grandfather    • Anxiety disorder Sister    • Depression Sister    • OCD Sister    • OCD Brother    • Anxiety disorder Maternal Uncle    • Bipolar disorder Maternal Uncle    • Depression Maternal Uncle    • Drug abuse Maternal Uncle    • Anxiety disorder Maternal Grandmother    • Bipolar disorder Maternal Grandmother    • Depression Maternal Grandmother    • Drug abuse Cousin        Substance Abuse History:   Alcohol use: Occasional  Nicotine: Former  Illicit Drug Use: Patient reports history of methamphetamine use (smoking).  Last meth use was in 2017.  Patient admits to smoking marijuana.  Longest Period Sober: Patient has been sober from methamphetamine and other drug use, except for marijuana, since 2017.  Rehab/AA/NA: Patient  reports doing rehab twice.  Last rehab was for 14 months in house treatment program which she completed in 2018.    Social History:  Living Situation: Patient currently lives with her fiancé and 3-month-old daughter.  Her stepchildren will sometimes stay with her.  Her 5-year-old daughter will stay with her for about half of the week.  Marital/Relationship History: Patient has been with marino for 3 years.  Patient states this is a very supportive relationship and denies any kind of abuse.  Children: Patient has a 3-month-old daughter and a 5-year-old daughter.  Work History/Occupation: Patient works at [employer] and has been there for 3 years.  Education: Patient completed high school and some college.   History: Denies  Legal: Patient reports she is a convicted felon and has many arrests which have all been drug related.    Social History     Socioeconomic History   • Marital status: Single   Tobacco Use   • Smoking status: Former     Packs/day: 0.50     Years: 18.00     Pack years: 9.00     Types: Cigarettes     Start date:      Quit date: 2023     Years since quittin.1   • Smokeless tobacco: Never   Vaping Use   • Vaping Use: Every day   • Substances: Nicotine, THC   • Devices: Disposable   Substance and Sexual Activity   • Alcohol use: Yes     Comment: occ   • Drug use: Not Currently     Types: Cocaine(coke), Methamphetamines, Heroin, Opium, Marijuana, PCP, LSD   • Sexual activity: Yes       Developmental History:   Place of birth: Patient was born in [place].  Siblings: 1 sister and 1 brother.  Childhood: Her parents have history of drug and alcohol abuse.  Otherwise she denies having any history of abuse or trauma.      Physical Exam:  Physical Exam    Appearance: appears to be of stated age, maintains good eye contact.   Behavior: Appropriate, cooperative. No acute distress.  Motor: No abnormal movements  Speech: Coherent and spontaneous speech. Normal reaction time to questions. No  "pressured speech.   Mood: \"I'm good\"  Affect: Pt does not appear depressed or anxious.  Thought content: Negative suicidal ideations, negative homicidal ideations. Patient denies any obsession, compulsion, or phobia. No evidence of delusions.  Perceptions: Negative auditory hallucinations, negative visual hallucinations. Pt does not appear to be actively responding to internal stimuli.   Thought process: Logical, goal-directed, coherent, and linear with no evidence of flight of ideas, looseness of associations, thought blocking, circumstantiality.  Insight/Judgement: Fair/fair  Cognition: Alert and oriented to person, place, and date. Memory intact for recent and remote events. Attention and concentration intact.     Vital Signs:   There were no vitals taken for this visit.     Lab Results:   Office Visit on 03/23/2023   Component Date Value Ref Range Status   • HCG, Urine, QL 03/23/2023 Negative  Negative Final   • Lot Number 03/23/2023 [id number]   Final   • Internal Positive Control 03/23/2023 Passed  Positive, Passed Final   • Internal Negative Control 03/23/2023 Passed  Negative, Passed Final   • Expiration Date 03/23/2023 123,123   Final   Office Visit on 03/31/2022   Component Date Value Ref Range Status   • Rapid Strep A Screen 03/31/2022 Negative  Negative, VALID, INVALID, Not Performed Final   • Internal Control 03/31/2022 Passed  Passed Final   • Lot Number 03/31/2022 [id number]   Final   • Expiration Date 03/31/2022 05/31/2023   Final       EKG Results:  No orders to display       Imaging Results:  No Images in the past 120 days found..      Assessment/Plan   Diagnoses and all orders for this visit:    1. Bipolar disorder, in full remission, most recent episode depressed (HCC)  -     lamoTRIgine (LaMICtal) 100 MG tablet; Take 1 tablet by mouth Daily for 90 days.  Dispense: 90 tablet; Refill: 1  -     ARIPiprazole (Abilify) 2 MG tablet; Take 2 tablets by mouth Daily for 90 days.  Dispense: 180 tablet; " Refill: 1    2. Mixed obsessional thoughts and acts  -     lamoTRIgine (LaMICtal) 100 MG tablet; Take 1 tablet by mouth Daily for 90 days.  Dispense: 90 tablet; Refill: 1  -     ARIPiprazole (Abilify) 2 MG tablet; Take 2 tablets by mouth Daily for 90 days.  Dispense: 180 tablet; Refill: 1    3. Generalized anxiety disorder  -     lamoTRIgine (LaMICtal) 100 MG tablet; Take 1 tablet by mouth Daily for 90 days.  Dispense: 90 tablet; Refill: 1  -     ARIPiprazole (Abilify) 2 MG tablet; Take 2 tablets by mouth Daily for 90 days.  Dispense: 180 tablet; Refill: 1    Presentation seems most consistent with bipolar disorder and anxiety, likely OCD.  We will continue Lamictal at current dose for management of bipolar, agitation, anxiety, OCD, and overall mood.  Will continue Abilify 4mg for management of bipolar, anxiety, and overall mood.  Follow-up in 3 months. Recommended journaling, self care.  Addressed all questions and concerns.      Visit Diagnoses:    ICD-10-CM ICD-9-CM   1. Bipolar disorder, in full remission, most recent episode depressed (HCC)  F31.76 296.56   2. Mixed obsessional thoughts and acts  F42.2 300.3   3. Generalized anxiety disorder  F41.1 300.02       PLAN:  1. Safety: No acute safety concerns at this time.  2. Therapy: Pt declines psychotherapy referral at this time.  3. Risk Assessment: Risk of self-harm acutely is moderate.  Risk factors include anxiety disorder, mood disorder, family history of suicide attempts, history of self harm in the past, and recent psychosocial stressors (pandemic). Protective factors include denies access to guns/weapons, no present SI, no history of suicide attempts in the past, minimal AODA, healthcare seeking, future orientation, willingness to engage in care.  Risk of self-harm chronically is also moderate, but could be further elevated in the event of treatment noncompliance and/or AODA.  4. Labs/Diagnostics Ordered:   No orders of the defined types were placed in  this encounter.    5. Medications:   New Medications Ordered This Visit   Medications   • lamoTRIgine (LaMICtal) 100 MG tablet     Sig: Take 1 tablet by mouth Daily for 90 days.     Dispense:  90 tablet     Refill:  1   • ARIPiprazole (Abilify) 2 MG tablet     Sig: Take 2 tablets by mouth Daily for 90 days.     Dispense:  180 tablet     Refill:  1     Discussed all risks, benefits, alternatives, and side effects of Lamictal, including but not limited to rash, rebound depressive or manic symptoms if prompt discontinuation, GI upset, agitation, and sedation. Pt instructed to avoid driving and doing other tasks or actions that require to be alert until knowing how the drug affects them. Pt informed that birth control pills and other hormone-based birth control may not work as well to prevent pregnancy and advised to use some other kind of birth control, such as condoms, while taking this med. Discussed the need for pt to immediately call the office for any new or worsening symptoms, such as rash, worsening depression; feeling nervous or restless; suicidal thoughts or actions; or other changes changes in mood or behavior, and all other concerns. Pt educated on med compliance and the risks of suddenly stopping this medication or missing doses. Pt verbalized understanding and is agreeable to taking Lamictal. Addressed all questions and concerns.     Discussed all risks, benefits, alternatives, and side effects of Aripiprazole, including but not limited to GI upset, headache, activating/sedating effects, dyslipidemia, extrapyramidal symptoms (dystonia, drug-induced parkinsonism, akathisia, tardive dyskinesia), lowering of seizure threshold, hematologic abnormalities, hyperglycemia, impulse control disorders, increased mortality in elderly patients with dementia-related psychosis, neuroleptic malignant syndrome, sexual dysfunction, orthostatic hypotension, falls risk in older adults, and temperature dysregulation. Pt  instructed to avoid driving and doing other tasks or actions that require to be alert until knowing how the drug affects them.  Pt educated on the need to practice safe sex while taking this med. Discussed the need for pt to immediately call the office for any new or worsening symptoms, such as worsening depression; feeling nervous or restless; suicidal thoughts or actions; or other changes changes in mood or behavior, and all other concerns. Pt educated on med compliance and the risks of suddenly stopping this medication or missing doses. Pt verbalized understanding and is agreeable to taking Aripiprazole. Addressed all questions and concerns.       6. Follow up:   F/u in 3 months.    TREATMENT PLAN/GOALS: Continue supportive psychotherapy efforts and medications as indicated. Treatment and medication options discussed during today's visit. Patient ackowledged and verbally consented to continue with current treatment plan and was educated on the importance of compliance with treatment and follow-up appointments.    MEDICATION ISSUES:  WYATT reviewed as expected.  Discussed medication options and treatment plan of prescribed medication as well as the risks, benefits, and side effects including potential falls, possible impaired driving and metabolic adversities among others. Patient is agreeable to call the office with any worsening of symptoms or onset of side effects. Patient is agreeable to call 911 or go to the nearest ER should he/she begin having SI/HI. No medication side effects or related complaints today.        This document has been electronically signed by Melyssa Dang PA-C  March 28, 2023 10:59 EDT      Part of this note may be an electronic transcription/translation of spoken language to printed text using the Dragon Dictation System.

## 2023-04-13 ENCOUNTER — OFFICE VISIT (OUTPATIENT)
Dept: FAMILY MEDICINE CLINIC | Facility: CLINIC | Age: 33
End: 2023-04-13
Payer: COMMERCIAL

## 2023-04-13 VITALS
SYSTOLIC BLOOD PRESSURE: 112 MMHG | WEIGHT: 153 LBS | BODY MASS INDEX: 27.11 KG/M2 | DIASTOLIC BLOOD PRESSURE: 62 MMHG | TEMPERATURE: 97.3 F | OXYGEN SATURATION: 99 % | HEIGHT: 63 IN | HEART RATE: 102 BPM

## 2023-04-13 DIAGNOSIS — Z11.59 NEED FOR HEPATITIS C SCREENING TEST: ICD-10-CM

## 2023-04-13 DIAGNOSIS — Z13.220 LIPID SCREENING: ICD-10-CM

## 2023-04-13 DIAGNOSIS — Z11.51 ENCOUNTER FOR SCREENING FOR HUMAN PAPILLOMAVIRUS (HPV): ICD-10-CM

## 2023-04-13 DIAGNOSIS — Z00.00 ANNUAL PHYSICAL EXAM: Primary | ICD-10-CM

## 2023-04-13 DIAGNOSIS — Z12.4 ENCOUNTER FOR SCREENING FOR CERVICAL CANCER: ICD-10-CM

## 2023-04-13 LAB
ALBUMIN SERPL-MCNC: 4.6 G/DL (ref 3.5–5.2)
ALBUMIN/GLOB SERPL: 1.6 G/DL
ALP SERPL-CCNC: 86 U/L (ref 39–117)
ALT SERPL W P-5'-P-CCNC: 22 U/L (ref 1–33)
ANION GAP SERPL CALCULATED.3IONS-SCNC: 9 MMOL/L (ref 5–15)
AST SERPL-CCNC: 26 U/L (ref 1–32)
BASOPHILS # BLD AUTO: 0.07 10*3/MM3 (ref 0–0.2)
BASOPHILS NFR BLD AUTO: 0.9 % (ref 0–1.5)
BILIRUB SERPL-MCNC: 0.5 MG/DL (ref 0–1.2)
BUN SERPL-MCNC: 17 MG/DL (ref 6–20)
BUN/CREAT SERPL: 22.4 (ref 7–25)
CALCIUM SPEC-SCNC: 9 MG/DL (ref 8.6–10.5)
CHLORIDE SERPL-SCNC: 101 MMOL/L (ref 98–107)
CHOLEST SERPL-MCNC: 141 MG/DL (ref 0–200)
CO2 SERPL-SCNC: 28 MMOL/L (ref 22–29)
CREAT SERPL-MCNC: 0.76 MG/DL (ref 0.57–1)
DEPRECATED RDW RBC AUTO: 41.8 FL (ref 37–54)
EGFRCR SERPLBLD CKD-EPI 2021: 108.3 ML/MIN/1.73
EOSINOPHIL # BLD AUTO: 0.07 10*3/MM3 (ref 0–0.4)
EOSINOPHIL NFR BLD AUTO: 0.9 % (ref 0.3–6.2)
ERYTHROCYTE [DISTWIDTH] IN BLOOD BY AUTOMATED COUNT: 12.1 % (ref 12.3–15.4)
GLOBULIN UR ELPH-MCNC: 2.8 GM/DL
GLUCOSE SERPL-MCNC: 104 MG/DL (ref 65–99)
HCT VFR BLD AUTO: 44.8 % (ref 34–46.6)
HCV AB SER DONR QL: NORMAL
HDLC SERPL-MCNC: 65 MG/DL (ref 40–60)
HGB BLD-MCNC: 14.9 G/DL (ref 12–15.9)
IMM GRANULOCYTES # BLD AUTO: 0.09 10*3/MM3 (ref 0–0.05)
IMM GRANULOCYTES NFR BLD AUTO: 1.1 % (ref 0–0.5)
LDLC SERPL CALC-MCNC: 66 MG/DL (ref 0–100)
LDLC/HDLC SERPL: 1.03 {RATIO}
LYMPHOCYTES # BLD AUTO: 2.43 10*3/MM3 (ref 0.7–3.1)
LYMPHOCYTES NFR BLD AUTO: 31 % (ref 19.6–45.3)
MCH RBC QN AUTO: 31.5 PG (ref 26.6–33)
MCHC RBC AUTO-ENTMCNC: 33.3 G/DL (ref 31.5–35.7)
MCV RBC AUTO: 94.7 FL (ref 79–97)
MONOCYTES # BLD AUTO: 0.66 10*3/MM3 (ref 0.1–0.9)
MONOCYTES NFR BLD AUTO: 8.4 % (ref 5–12)
NEUTROPHILS NFR BLD AUTO: 4.52 10*3/MM3 (ref 1.7–7)
NEUTROPHILS NFR BLD AUTO: 57.7 % (ref 42.7–76)
NRBC BLD AUTO-RTO: 0 /100 WBC (ref 0–0.2)
PLATELET # BLD AUTO: 339 10*3/MM3 (ref 140–450)
PMV BLD AUTO: 10.6 FL (ref 6–12)
POTASSIUM SERPL-SCNC: 3.7 MMOL/L (ref 3.5–5.2)
PROT SERPL-MCNC: 7.4 G/DL (ref 6–8.5)
RBC # BLD AUTO: 4.73 10*6/MM3 (ref 3.77–5.28)
SODIUM SERPL-SCNC: 138 MMOL/L (ref 136–145)
TRIGL SERPL-MCNC: 45 MG/DL (ref 0–150)
TSH SERPL DL<=0.05 MIU/L-ACNC: 0.83 UIU/ML (ref 0.27–4.2)
VLDLC SERPL-MCNC: 10 MG/DL (ref 5–40)
WBC NRBC COR # BLD: 7.84 10*3/MM3 (ref 3.4–10.8)

## 2023-04-13 PROCEDURE — 84443 ASSAY THYROID STIM HORMONE: CPT | Performed by: NURSE PRACTITIONER

## 2023-04-13 PROCEDURE — 85025 COMPLETE CBC W/AUTO DIFF WBC: CPT | Performed by: NURSE PRACTITIONER

## 2023-04-13 PROCEDURE — 80061 LIPID PANEL: CPT | Performed by: NURSE PRACTITIONER

## 2023-04-13 PROCEDURE — 86803 HEPATITIS C AB TEST: CPT | Performed by: NURSE PRACTITIONER

## 2023-04-13 PROCEDURE — 80053 COMPREHEN METABOLIC PANEL: CPT | Performed by: NURSE PRACTITIONER

## 2023-04-13 PROCEDURE — 87624 HPV HI-RISK TYP POOLED RSLT: CPT | Performed by: NURSE PRACTITIONER

## 2023-04-13 PROCEDURE — G0123 SCREEN CERV/VAG THIN LAYER: HCPCS | Performed by: NURSE PRACTITIONER

## 2023-04-13 NOTE — PROGRESS NOTES
Venipuncture Blood Specimen Collection  Venipuncture performed in left arm  by Tatiana Arauz with good hemostasis. Patient tolerated the procedure well without complications.   04/13/23   Tatiana Arauz

## 2023-04-20 LAB
CYTOLOGIST CVX/VAG CYTO: NORMAL
CYTOLOGY CVX/VAG DOC CYTO: NORMAL
CYTOLOGY CVX/VAG DOC THIN PREP: NORMAL
DX ICD CODE: NORMAL
HIV 1 & 2 AB SER-IMP: NORMAL
HPV I/H RISK 4 DNA CVX QL PROBE+SIG AMP: NEGATIVE
Lab: NORMAL
OTHER STN SPEC: NORMAL
STAT OF ADQ CVX/VAG CYTO-IMP: NORMAL

## 2023-09-26 DIAGNOSIS — F31.4 BIPOLAR DISORDER, CURRENT EPISODE DEPRESSED, SEVERE, WITHOUT PSYCHOTIC FEATURES: ICD-10-CM

## 2023-09-26 DIAGNOSIS — F41.1 GENERALIZED ANXIETY DISORDER: ICD-10-CM

## 2023-09-27 ENCOUNTER — TELEPHONE (OUTPATIENT)
Dept: PSYCHIATRY | Facility: CLINIC | Age: 33
End: 2023-09-27
Payer: COMMERCIAL

## 2023-09-27 RX ORDER — LAMOTRIGINE 100 MG/1
150 TABLET ORAL DAILY
Qty: 45 TABLET | Refills: 1 | Status: SHIPPED | OUTPATIENT
Start: 2023-09-27 | End: 2023-10-27

## 2023-09-27 NOTE — TELEPHONE ENCOUNTER
ATTEMPTED TO CONTACT PT(PATIENT)      NO ANSWER      LEFT VOICEMAIL WITH INSTRUCTIONS TO RETURN CALL TO OFFICE AT [phone]

## 2023-11-01 ENCOUNTER — OFFICE VISIT (OUTPATIENT)
Dept: FAMILY MEDICINE CLINIC | Facility: CLINIC | Age: 33
End: 2023-11-01
Payer: COMMERCIAL

## 2023-11-01 VITALS
DIASTOLIC BLOOD PRESSURE: 72 MMHG | SYSTOLIC BLOOD PRESSURE: 118 MMHG | OXYGEN SATURATION: 98 % | TEMPERATURE: 97.3 F | HEART RATE: 104 BPM | WEIGHT: 145 LBS | HEIGHT: 63 IN | BODY MASS INDEX: 25.69 KG/M2

## 2023-11-01 DIAGNOSIS — K58.0 IRRITABLE BOWEL SYNDROME WITH DIARRHEA: Primary | ICD-10-CM

## 2023-11-01 PROCEDURE — 99214 OFFICE O/P EST MOD 30 MIN: CPT | Performed by: NURSE PRACTITIONER

## 2023-11-01 PROCEDURE — 1159F MED LIST DOCD IN RCRD: CPT | Performed by: NURSE PRACTITIONER

## 2023-11-01 PROCEDURE — 1160F RVW MEDS BY RX/DR IN RCRD: CPT | Performed by: NURSE PRACTITIONER

## 2023-11-01 RX ORDER — DICYCLOMINE HCL 20 MG
20 TABLET ORAL 4 TIMES DAILY PRN
Qty: 60 TABLET | Refills: 2 | Status: SHIPPED | OUTPATIENT
Start: 2023-11-01

## 2023-11-01 NOTE — PROGRESS NOTES
"Answers submitted by the patient for this visit:  Other (Submitted on 11/1/2023)  Please describe your symptoms.: IBS  Have you had these symptoms before?: Yes  How long have you been having these symptoms?: 1-4 days  Please describe any probable cause for these symptoms. : Something I ate I guess  Primary Reason for Visit (Submitted on 11/1/2023)  What is the primary reason for your visit?: Other  Chief Complaint  GI Problem (Patient has IBS and she has been having flare up.  She had to call in work and she needs some new medicine or maybe needs to be reevaluated.  )    Eugene Lopes presents to [facility]  History of Present Illness  Patient is here today with concerns of her IBS flaring up for the past 4 days-and she does have a history of IBS and she has had to call into work and reports that she thinks she may need a new \"medication or possibly a new review evaluation\" regarding this--last saw gastro and was Dx'd at age 17 y/o --pt reports happens freq--and then reports will go 2 months sometimes and no issues--and then other times eat something she always eats and then diarrhea x 3 days--happens approx 3-4 times monthly--and patient reports that at times it peaks and the pain/cramping is 8 out of 10 on the pain scale and then today she is 0 out of 10 on the pain scale      PHQ-2 Total Score: 0  PHQ-9 Total Score: 0    Past Medical History:   Diagnosis Date    Anxiety     Complication of pregnancy, childbirth and puerperium 2/24/2021    - History of broken ribs in prior pregnanany-so far no problems with this one.    Depression     Obsessive-compulsive disorder     Panic disorder     Self-injurious behavior     Substance abuse     Withdrawal symptoms, drug or narcotic        Allergies   Allergen Reactions    Erythromycin Rash    Sulfa Antibiotics Rash        Past Surgical History:   Procedure Laterality Date    COLONOSCOPY      D & C AND LAPAROSCOPY      " ENDOSCOPY      TEAR DUCT SURGERY      VAGINAL BIRTH AFTER  SECTION          Social History     Tobacco Use    Smoking status: Former     Packs/day: 0.50     Years: 4.00     Additional pack years: 0.00     Total pack years: 2.00     Types: Cigarettes     Quit date: 2023     Years since quittin.7     Passive exposure: Past    Smokeless tobacco: Never   Vaping Use    Vaping Use: Every day    Substances: Nicotine, THC    Devices: Disposable   Substance Use Topics    Alcohol use: Yes     Comment: occ    Drug use: Not Currently     Types: Cocaine(coke), Heroin, LSD, Marijuana, Methamphetamines, Opium, PCP       Family History   Problem Relation Age of Onset    Mental illness Mother     Anxiety disorder Mother     Bipolar disorder Mother     Depression Mother     Drug abuse Mother     OCD Mother     Self-Injurious Behavior  Mother     Suicide Attempts Mother     Hypertension Father     Alcohol abuse Father     OCD Father     Heart attack Paternal Uncle     Heart attack Paternal Grandfather     Anxiety disorder Sister     Depression Sister     OCD Sister     OCD Brother     Anxiety disorder Maternal Uncle     Bipolar disorder Maternal Uncle     Depression Maternal Uncle     Drug abuse Maternal Uncle     Anxiety disorder Maternal Grandmother     Bipolar disorder Maternal Grandmother     Depression Maternal Grandmother     Drug abuse Cousin         Health Maintenance Due   Topic Date Due    INFLUENZA VACCINE  Never done        Current Outpatient Medications on File Prior to Visit   Medication Sig    norelgestromin-ethinyl estradiol (ORTHO EVRA) 150-35 MCG/24HR Place 1 patch on the skin as directed by provider 1 (One) Time Per Week.    triazolam (HALCION) 0.25 MG tablet TAKE 1 TABLET BY MOUTH BY MOUTH AS DIRECTED AND BRING PRESCRIPTION TO DENTAL APPOINTMENT TO BE GIVEN BY DENTIST    lamoTRIgine (LaMICtal) 100 MG tablet Take 1.5 tablets by mouth Daily for 30 days.     No current facility-administered medications  "on file prior to visit.       Immunization History   Administered Date(s) Administered    DTaP, Unspecified 07/15/1996    MMR 07/15/1996, 08/23/2021    OPV 07/15/1996    Rho (D) Immune Globulin 06/16/2021    Tdap 07/27/2017, 06/16/2021       Review of Systems   Constitutional:  Negative for chills and fever.   HENT:  Negative for trouble swallowing.    Respiratory:  Negative for shortness of breath.    Cardiovascular:  Negative for chest pain.   Gastrointestinal:  Positive for diarrhea. Negative for abdominal pain.        Abd cramping --during a flare    Genitourinary:  Negative for dysuria.   Neurological:  Negative for dizziness, syncope and light-headedness.   Psychiatric/Behavioral:  Positive for stress. Negative for self-injury and suicidal ideas.         Objective     /72 (BP Location: Left arm, Patient Position: Sitting, Cuff Size: Adult)   Pulse 104   Temp 97.3 °F (36.3 °C) (Temporal)   Ht 158.8 cm (62.5\")   Wt 65.8 kg (145 lb)   SpO2 98%   BMI 26.10 kg/m²       Physical Exam  Vitals and nursing note reviewed.   Constitutional:       Appearance: Normal appearance.      Comments: Lost 8#   HENT:      Head: Normocephalic.      Right Ear: External ear normal.      Left Ear: External ear normal.      Nose: Nose normal.      Mouth/Throat:      Mouth: Mucous membranes are moist.   Eyes:      Pupils: Pupils are equal, round, and reactive to light.   Cardiovascular:      Rate and Rhythm: Normal rate and regular rhythm.      Heart sounds: Normal heart sounds.   Pulmonary:      Effort: Pulmonary effort is normal.      Breath sounds: Normal breath sounds.   Abdominal:      General: Bowel sounds are normal.      Palpations: Abdomen is soft.      Tenderness: There is no abdominal tenderness. There is no guarding.   Musculoskeletal:         General: Normal range of motion.      Cervical back: Normal range of motion.   Skin:     General: Skin is warm and dry.   Neurological:      Mental Status: She is alert and " oriented to person, place, and time.   Psychiatric:         Mood and Affect: Mood normal.         Behavior: Behavior normal.         Thought Content: Thought content normal.         Judgment: Judgment normal.         Result Review :                           Assessment and Plan      Diagnoses and all orders for this visit:    1. Irritable bowel syndrome with diarrhea (Primary)  -     dicyclomine (BENTYL) 20 MG tablet; Take 1 tablet by mouth 4 (Four) Times a Day As Needed for Abdominal Cramping (and IBS-D).  Dispense: 60 tablet; Refill: 2  -     Ambulatory Referral to Gastroenterology    Cannot recall what dose they had her on long time ago on Bentyl and I am thinking because she was diagnosed as a teenager most likely it was a 10 mg we will trial the 20 mg and then go ahead and proceed with referral to gastroenterology    An excuse for work today        Follow Up     Return if symptoms worsen or fail to improve.    Patient was given instructions and counseling regarding her condition or for health maintenance advice. Please see specific information pulled into the AVS if appropriate.            Mariam Lopes  reports that she quit smoking about 8 months ago. Her smoking use included cigarettes. She has a 2.00 pack-year smoking history. She has been exposed to tobacco smoke. She has never used smokeless tobacco.. I have educated her on the risk of diseases from using tobacco products such as cancer, COPD, and heart disease.

## 2023-11-08 ENCOUNTER — TELEMEDICINE (OUTPATIENT)
Dept: PSYCHIATRY | Facility: CLINIC | Age: 33
End: 2023-11-08
Payer: COMMERCIAL

## 2023-11-08 DIAGNOSIS — F41.1 GENERALIZED ANXIETY DISORDER: ICD-10-CM

## 2023-11-08 DIAGNOSIS — F31.4 BIPOLAR DISORDER, CURRENT EPISODE DEPRESSED, SEVERE, WITHOUT PSYCHOTIC FEATURES: Primary | ICD-10-CM

## 2023-11-08 DIAGNOSIS — F42.2 MIXED OBSESSIONAL THOUGHTS AND ACTS: ICD-10-CM

## 2023-11-08 RX ORDER — LAMOTRIGINE 100 MG/1
150 TABLET ORAL DAILY
Qty: 135 TABLET | Refills: 0 | Status: SHIPPED | OUTPATIENT
Start: 2023-11-08 | End: 2024-02-06

## 2023-11-08 NOTE — PROGRESS NOTES
This provider is located at [address], [address], [place] [address]. The Patient is seen remotely using [employer]. Patient is being seen via telehealth and confirm that they are in a secure environment for this session. The patient's condition being diagnosed/treated is appropriate for telemedicine. The provider identified herself as well as her credentials.   The patient gave consent to be seen remotely, and when consent is given they understand that the consent allows for patient identifiable information to be sent to a third party as needed.   They may refuse to be seen remotely at any time. The electronic data is encrypted and password protected, and the patient has been advised of the potential risks to privacy not withstanding such measures.    Virtual visit via Zoom audio and video due to the COVID-19 pandemic.  Patient is accepting of and agreeable to appointment.  The appointment consisted of the patient and I only.      Mode of visit: Video  Location of provider: [address], [place] [address].  Location of patient: Home  Does the patient consent to use a video/audio connection for your medical care today? Yes  The visit included audio and video interaction. No technical issues occurred during this visit.    Chief Complaint:  Anxiety     History of Present Illness: Mariam Lopes is a 33 y.o. female who presents today for follow-up of mood.  Pt has been lost to follow up since last visit on 6/27/23. Pt ran out of abilify and has been out of this for one month. She has had more energy since stopping this med. Pt c/o depression that is no longer constant, comes and goes, occurs maybe once a week, rates it a 4-5/10. Pt denies having any SI or HI. Pt adamantly denies having any SI after discussing questionnaire. Pt denies having any cravings for methamphetamine such as at last visit. Pt c/o anxiety that is constant and rates it a 1-10/10. No panic attacks. She also  c/o irritability. No symptoms of hypomania or melany. No compulsions or feeling the need to count.     Medical Record Review: Reviewed office visit note from 11/16/21, pt seen for anxiety/depression, postpartum depression. She has managed her symptoms by smoking marijuana. S/p Zoloft in the past and that put her in the mental hospital. She does have bipolar disorder and in the past she has been prescribed Lamictal and that is the only thing that really seemed to help her.  S/p celexa, buspar, hydroxyzine. PHQ-9 score of 14 at visit. Pt denies having any thoughts of harming her baby.     Reviewed recent lab results from 11/16/21, CBC WNL (except MCV 99.8, immature grans 1.5%, abs immature grans 0.13), CMP WNL (except calcium 8.3), TSH and FT4 WNL    PHQ-9 Depression Screening  Little interest or pleasure in doing things? (P) 1-->several days   Feeling down, depressed, or hopeless? (P) 1-->several days   Trouble falling or staying asleep, or sleeping too much? (P) 0-->not at all   Feeling tired or having little energy? (P) 1-->several days   Poor appetite or overeating? (P) 1-->several days   Feeling bad about yourself - or that you are a failure or have let yourself or your family down? (P) 1-->several days   Trouble concentrating on things, such as reading the newspaper or watching television? (P) 1-->several days   Moving or speaking so slowly that other people could have noticed? Or the opposite - being so fidgety or restless that you have been moving around a lot more than usual? (P) 0-->not at all   Thoughts that you would be better off dead, or of hurting yourself in some way? (P) 1-->several days   PHQ-9 Total Score (P) 7   If you checked off any problems, how difficult have these problems made it for you to do your work, take care of things at home, or get along with other people? (P) somewhat difficult     OMAIRA-7:  Over the last 2 weeks, how often have you been bothered by any of the following  problems?  Feeling nervous, anxious, or on edge: Nearly every day  Not being able to stop or control worrying: Nearly every day  Worrying too much about different things: Nearly every day  Trouble relaxing: Nearly every day  Being so restless that it is hard to sit still: Not at all  Becoming easily annoyed or irritable: Nearly every day  Feeling afraid as if something awful might happen: Nearly every day  OMAIRA-7 Total Score: 18        ROS:  Review of Systems   Constitutional:  Negative for appetite change, diaphoresis, fatigue and unexpected weight change.   HENT:  Negative for drooling, tinnitus and trouble swallowing.    Eyes:  Negative for visual disturbance.   Respiratory:  Negative for cough, chest tightness and shortness of breath.    Cardiovascular:  Negative for chest pain, palpitations and leg swelling.   Gastrointestinal:  Negative for abdominal pain, constipation, diarrhea, nausea and vomiting.   Endocrine: Negative for cold intolerance and heat intolerance.   Genitourinary:  Negative for difficulty urinating.   Musculoskeletal:  Negative for arthralgias, joint swelling and myalgias.   Skin:  Negative for rash.   Allergic/Immunologic: Negative for immunocompromised state.   Neurological:  Negative for dizziness, tremors, seizures, speech difficulty, numbness and headaches.   Psychiatric/Behavioral:  Positive for agitation and dysphoric mood. Negative for hallucinations, self-injury, sleep disturbance and suicidal ideas. The patient is nervous/anxious.        Problem List:  Patient Active Problem List   Diagnosis    Cervical cancer screening    Gastroesophageal reflux disease    Nausea    Normal pregnancy in multigravida    RhD negative    Type A blood, Rh negative    Underimmunization status       Current Medications:   Current Outpatient Medications   Medication Sig Dispense Refill    lamoTRIgine (LaMICtal) 100 MG tablet Take 1.5 tablets by mouth Daily for 90 days. 135 tablet 0    Cariprazine HCl  "(Vraylar) 1.5 MG capsule capsule Take 1 capsule by mouth Daily for 30 days. 30 capsule 1    dicyclomine (BENTYL) 20 MG tablet Take 1 tablet by mouth 4 (Four) Times a Day As Needed for Abdominal Cramping (and IBS-D). 60 tablet 2    norelgestromin-ethinyl estradiol (ORTHO EVRA) 150-35 MCG/24HR Place 1 patch on the skin as directed by provider 1 (One) Time Per Week. 3 patch 12    triazolam (HALCION) 0.25 MG tablet TAKE 1 TABLET BY MOUTH BY MOUTH AS DIRECTED AND BRING PRESCRIPTION TO DENTAL APPOINTMENT TO BE GIVEN BY DENTIST       No current facility-administered medications for this visit.       Discontinued Medications:  Medications Discontinued During This Encounter   Medication Reason    lamoTRIgine (LaMICtal) 100 MG tablet Reorder         Allergy:   Allergies   Allergen Reactions    Erythromycin Rash    Sulfa Antibiotics Rash        Past Medical History:  Past Medical History:   Diagnosis Date    Anxiety     Complication of pregnancy, childbirth and puerperium 2021    - History of broken ribs in prior pregnanany-so far no problems with this one.    Depression     Obsessive-compulsive disorder     Panic disorder     Self-injurious behavior     Substance abuse     Withdrawal symptoms, drug or narcotic        Past Surgical History:  Past Surgical History:   Procedure Laterality Date    COLONOSCOPY      D & C AND LAPAROSCOPY      ENDOSCOPY      TEAR DUCT SURGERY      VAGINAL BIRTH AFTER  SECTION         Past Psychiatric History:  Began Treatment: Patient has been in therapy since seventh grade due to outbursts.   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   Therapist: Pt did therapy from the age in 7th grade until she was 18 years old.   Admission History: Patient was admitted when she was in high school to [facility] due to SI and HI, and increased agitation after " being started on Zoloft.  Medications/Treatment: Patient has been on Zoloft (SI, HI, increased agitation), Celexa, BuSpar, Seroquel (weight gain), guanfacine. Pt reports being on Lamictal in the past and worked well with controlling her symptoms. S/p hydroxyzine (increased agitation), abilify  Self Harm: History of self-harm with cutting her arms and thighs, which she lasted in the seventh/eighth grade.  Patient does admit to hitting herself out of frustration at times.   Suicide Attempts: Denies    Family Psychiatric History:   Diagnoses: Her mother has history of bipolar disorder, anxiety, depression, OCD.  Her father has history of OCD.  Her sister has a history of anxiety, depression, OCD.  Her brother has a history of OCD.  Substance use: Her mother has a history of drug abuse, father has history of EtOH abuse.  Suicide Attempts/Completions: Her mother attempted suicide multiple times.  Patient denies any family history of suicide completions.    Family History   Problem Relation Age of Onset    Mental illness Mother     Anxiety disorder Mother     Bipolar disorder Mother     Depression Mother     Drug abuse Mother     OCD Mother     Self-Injurious Behavior  Mother     Suicide Attempts Mother     Hypertension Father     Alcohol abuse Father     OCD Father     Heart attack Paternal Uncle     Heart attack Paternal Grandfather     Anxiety disorder Sister     Depression Sister     OCD Sister     OCD Brother     Anxiety disorder Maternal Uncle     Bipolar disorder Maternal Uncle     Depression Maternal Uncle     Drug abuse Maternal Uncle     Anxiety disorder Maternal Grandmother     Bipolar disorder Maternal Grandmother     Depression Maternal Grandmother     Drug abuse Cousin        Substance Abuse History:   Alcohol use: Occasional  Nicotine: Former  Illicit Drug Use: Patient reports history of methamphetamine use (smoking).  Last meth use was in 2017.  Patient admits to smoking marijuana.  Longest Period Sober:  Patient has been sober from methamphetamine and other drug use, except for marijuana, since 2017.  Rehab/AA/NA: Patient reports doing rehab twice.  Last rehab was for 14 months in house treatment program which she completed in 2018.    Social History:  Living Situation: Patient currently lives with her marino and 3-month-old daughter.  Her stepchildren will sometimes stay with her.  Her 5-year-old daughter will stay with her for about half of the week.  Marital/Relationship History: Patient has been with marino for 3 years.  Patient states this is a very supportive relationship and denies any kind of abuse.  Children: Patient has a 3-month-old daughter and a 5-year-old daughter.  Work History/Occupation: Patient works at [employer] and has been there for 3 years.  Education: Patient completed high school and some college.   History: Denies  Legal: Patient reports she is a convicted felon and has many arrests which have all been drug related.    Social History     Socioeconomic History    Marital status: Single   Tobacco Use    Smoking status: Former     Packs/day: 0.50     Years: 4.00     Additional pack years: 0.00     Total pack years: 2.00     Types: Cigarettes     Quit date: 2023     Years since quittin.7     Passive exposure: Past    Smokeless tobacco: Never   Vaping Use    Vaping Use: Every day    Substances: Nicotine, THC    Devices: Disposable   Substance and Sexual Activity    Alcohol use: Yes     Comment: occ    Drug use: Not Currently     Types: Cocaine(coke), Heroin, LSD, Marijuana, Methamphetamines, Opium, PCP    Sexual activity: Yes       Developmental History:   Place of birth: Patient was born in [place].  Siblings: 1 sister and 1 brother.  Childhood: Her parents have history of drug and alcohol abuse.  Otherwise she denies having any history of abuse or trauma.      Physical Exam:  Physical Exam    Appearance: appears to be of stated age, maintains good eye contact.   Behavior:  "Appropriate, cooperative. No acute distress.  Motor: No abnormal movements  Speech: Coherent and spontaneous speech. Normal reaction time to questions. No pressured speech.   Mood: \"I'm okay\"  Affect: Pt appears slightly anxious.  Thought content: Negative suicidal ideations, negative homicidal ideations. Patient denies any obsession, compulsion, or phobia. No evidence of delusions.  Perceptions: Negative auditory hallucinations, negative visual hallucinations. Pt does not appear to be actively responding to internal stimuli.   Thought process: Logical, goal-directed, coherent, and linear with no evidence of flight of ideas, looseness of associations, thought blocking, circumstantiality.  Insight/Judgement: Fair/fair  Cognition: Alert and oriented to person, place, and date. Memory intact for recent and remote events. Attention and concentration intact.     Vital Signs:   There were no vitals taken for this visit.     Lab Results:   Office Visit on 04/13/2023   Component Date Value Ref Range Status    WBC 04/13/2023 7.84  3.40 - 10.80 10*3/mm3 Final    RBC 04/13/2023 4.73  3.77 - 5.28 10*6/mm3 Final    Hemoglobin 04/13/2023 14.9  12.0 - 15.9 g/dL Final    Hematocrit 04/13/2023 44.8  34.0 - 46.6 % Final    MCV 04/13/2023 94.7  79.0 - 97.0 fL Final    MCH 04/13/2023 31.5  26.6 - 33.0 pg Final    MCHC 04/13/2023 33.3  31.5 - 35.7 g/dL Final    RDW 04/13/2023 12.1 (L)  12.3 - 15.4 % Final    RDW-SD 04/13/2023 41.8  37.0 - 54.0 fl Final    MPV 04/13/2023 10.6  6.0 - 12.0 fL Final    Platelets 04/13/2023 339  140 - 450 10*3/mm3 Final    Neutrophil % 04/13/2023 57.7  42.7 - 76.0 % Final    Lymphocyte % 04/13/2023 31.0  19.6 - 45.3 % Final    Monocyte % 04/13/2023 8.4  5.0 - 12.0 % Final    Eosinophil % 04/13/2023 0.9  0.3 - 6.2 % Final    Basophil % 04/13/2023 0.9  0.0 - 1.5 % Final    Immature Grans % 04/13/2023 1.1 (H)  0.0 - 0.5 % Final    Neutrophils, Absolute 04/13/2023 4.52  1.70 - 7.00 10*3/mm3 Final    Lymphocytes, " Absolute 04/13/2023 2.43  0.70 - 3.10 10*3/mm3 Final    Monocytes, Absolute 04/13/2023 0.66  0.10 - 0.90 10*3/mm3 Final    Eosinophils, Absolute 04/13/2023 0.07  0.00 - 0.40 10*3/mm3 Final    Basophils, Absolute 04/13/2023 0.07  0.00 - 0.20 10*3/mm3 Final    Immature Grans, Absolute 04/13/2023 0.09 (H)  0.00 - 0.05 10*3/mm3 Final    nRBC 04/13/2023 0.0  0.0 - 0.2 /100 WBC Final    Glucose 04/13/2023 104 (H)  65 - 99 mg/dL Final    BUN 04/13/2023 17  6 - 20 mg/dL Final    Creatinine 04/13/2023 0.76  0.57 - 1.00 mg/dL Final    Sodium 04/13/2023 138  136 - 145 mmol/L Final    Potassium 04/13/2023 3.7  3.5 - 5.2 mmol/L Final    Chloride 04/13/2023 101  98 - 107 mmol/L Final    CO2 04/13/2023 28.0  22.0 - 29.0 mmol/L Final    Calcium 04/13/2023 9.0  8.6 - 10.5 mg/dL Final    Total Protein 04/13/2023 7.4  6.0 - 8.5 g/dL Final    Albumin 04/13/2023 4.6  3.5 - 5.2 g/dL Final    ALT (SGPT) 04/13/2023 22  1 - 33 U/L Final    AST (SGOT) 04/13/2023 26  1 - 32 U/L Final    Alkaline Phosphatase 04/13/2023 86  39 - 117 U/L Final    Total Bilirubin 04/13/2023 0.5  0.0 - 1.2 mg/dL Final    Globulin 04/13/2023 2.8  gm/dL Final    A/G Ratio 04/13/2023 1.6  g/dL Final    BUN/Creatinine Ratio 04/13/2023 22.4  7.0 - 25.0 Final    Anion Gap 04/13/2023 9.0  5.0 - 15.0 mmol/L Final    eGFR 04/13/2023 108.3  >60.0 mL/min/1.73 Final    Total Cholesterol 04/13/2023 141  0 - 200 mg/dL Final    Triglycerides 04/13/2023 45  0 - 150 mg/dL Final    HDL Cholesterol 04/13/2023 65 (H)  40 - 60 mg/dL Final    LDL Cholesterol  04/13/2023 66  0 - 100 mg/dL Final    VLDL Cholesterol 04/13/2023 10  5 - 40 mg/dL Final    LDL/HDL Ratio 04/13/2023 1.03   Final    TSH 04/13/2023 0.832  0.270 - 4.200 uIU/mL Final    Hepatitis C Ab 04/13/2023 Non-Reactive  Non-Reactive Final    Diagnosis 04/13/2023 Comment   Final    NEGATIVE FOR INTRAEPITHELIAL LESION OR MALIGNANCY.  THIS SPECIMEN WAS RESCREENED AS PART OF OUR  PROGRAM.    Specimen adequacy:  04/13/2023 Comment   Final    Satisfactory for evaluation.  Endocervical and/or squamous metaplastic  cells (endocervical component) are present.    Clinician Provided ICD-10: 04/13/2023 Comment   Final    Z12.4  Z11.51    Performed by: 04/13/2023 Comment   Final    Shanelle Cui, Cytotechnologist ([facility])    QC reviewed by: 04/13/2023 Comment   Final    Brittnee Blanco, Cytotechnologist ([facility])    . 04/13/2023 .   Final    Note: 04/13/2023 Comment   Final    The Pap smear is a screening test designed to aid in the detection of  premalignant and malignant conditions of the uterine cervix.  It is not a  diagnostic procedure and should not be used as the sole means of detecting  cervical cancer.  Both false-positive and false-negative reports do occur.    Method: 04/13/2023 Comment   Final    This liquid based ThinPrep(R) pap test was screened with the  use of an image guided system.    HPV Aptima 04/13/2023 Negative  Negative Final    This nucleic acid amplification test detects fourteen high-risk  HPV types (16,18,31,33,35,39,45,51,52,56,58,59,66,68) without  differentiation.   Office Visit on 03/23/2023   Component Date Value Ref Range Status    HCG, Urine, QL 03/23/2023 Negative  Negative Final    Lot Number 03/23/2023 [id number]   Final    Internal Positive Control 03/23/2023 Passed  Positive, Passed Final    Internal Negative Control 03/23/2023 Passed  Negative, Passed Final    Expiration Date 03/23/2023 123,123   Final       EKG Results:  No orders to display       Imaging Results:  No Images in the past 120 days found..      Assessment/Plan   Diagnoses and all orders for this visit:    1. Bipolar disorder, current episode depressed, severe, without psychotic features (Primary)  -     Cariprazine HCl (Vraylar) 1.5 MG capsule capsule; Take 1 capsule by mouth Daily for 30 days.  Dispense: 30 capsule; Refill: 1  -     lamoTRIgine (LaMICtal) 100 MG tablet; Take 1.5 tablets by mouth Daily for 90 days.  Dispense: 135  tablet; Refill: 0    2. Generalized anxiety disorder  -     lamoTRIgine (LaMICtal) 100 MG tablet; Take 1.5 tablets by mouth Daily for 90 days.  Dispense: 135 tablet; Refill: 0    3. Mixed obsessional thoughts and acts  -     lamoTRIgine (LaMICtal) 100 MG tablet; Take 1.5 tablets by mouth Daily for 90 days.  Dispense: 135 tablet; Refill: 0      Patient screened positive for depression based on a PHQ-9 score of 7 on 11/8/2023. Follow-up recommendations include: Suicide Risk Assessment performed and see assessment below .    Presentation seems most consistent with bipolar disorder and anxiety, OCD.  We will continue Lamictal at current dose for management of bipolar, agitation, anxiety, OCD, and overall mood.  Will d/c abilify as pt has already stopped this. Will start on vraylar for management of bipolar and overall mood. Follow-up in 1 month.  Addressed all questions and concerns.      Visit Diagnoses:    ICD-10-CM ICD-9-CM   1. Bipolar disorder, current episode depressed, severe, without psychotic features  F31.4 296.53   2. Generalized anxiety disorder  F41.1 300.02   3. Mixed obsessional thoughts and acts  F42.2 300.3         PLAN:  Safety: No acute safety concerns at this time.  Therapy: Will refer for psychotherapy to [facility].  Risk Assessment: Risk of self-harm acutely is moderate.  Risk factors include anxiety disorder, mood disorder, family history of suicide attempts, history of self harm in the past, and recent psychosocial stressors (pandemic). Protective factors include denies access to guns/weapons, no present SI, no history of suicide attempts in the past, minimal AODA, healthcare seeking, future orientation, willingness to engage in care.  Risk of self-harm chronically is also moderate, but could be further elevated in the event of treatment noncompliance and/or AODA.  Labs/Diagnostics Ordered:   No orders of the defined types were placed in this encounter.    Medications:   New Medications Ordered  This Visit   Medications    Cariprazine HCl (Vraylar) 1.5 MG capsule capsule     Sig: Take 1 capsule by mouth Daily for 30 days.     Dispense:  30 capsule     Refill:  1    lamoTRIgine (LaMICtal) 100 MG tablet     Sig: Take 1.5 tablets by mouth Daily for 90 days.     Dispense:  135 tablet     Refill:  0     Discussed all risks, benefits, alternatives, and side effects of Lamictal, including but not limited to rash, rebound depressive or manic symptoms if prompt discontinuation, GI upset, agitation, and sedation. Pt instructed to avoid driving and doing other tasks or actions that require to be alert until knowing how the drug affects them. Pt informed that birth control pills and other hormone-based birth control may not work as well to prevent pregnancy and advised to use some other kind of birth control, such as condoms, while taking this med. Discussed the need for pt to immediately call the office for any new or worsening symptoms, such as rash, worsening depression; feeling nervous or restless; suicidal thoughts or actions; or other changes changes in mood or behavior, and all other concerns. Pt educated on med compliance and the risks of suddenly stopping this medication or missing doses. Pt verbalized understanding and is agreeable to taking Lamictal. Addressed all questions and concerns.     Vraylar, Risks, benefits, alternatives discussed with patient including nausea and vomiting, GI upset, sedation, akathisia, theoretical risk of tardive dyskinesia, and weight gain. Use care when operating vehicle, vessel, or machine. After discussion of these risks and benefits, the patient voiced understanding and agreed to proceed.      Follow up:   F/u in 1 month    TREATMENT PLAN/GOALS: Continue supportive psychotherapy efforts and medications as indicated. Treatment and medication options discussed during today's visit. Patient ackowledged and verbally consented to continue with current treatment plan and was  educated on the importance of compliance with treatment and follow-up appointments.    MEDICATION ISSUES:  WYATT reviewed as expected.  Discussed medication options and treatment plan of prescribed medication as well as the risks, benefits, and side effects including potential falls, possible impaired driving and metabolic adversities among others. Patient is agreeable to call the office with any worsening of symptoms or onset of side effects. Patient is agreeable to call 911 or go to the nearest ER should he/she begin having SI/HI. No medication side effects or related complaints today.        This document has been electronically signed by Melyssa Dang PA-C  November 8, 2023 10:38 EST      Part of this note may be an electronic transcription/translation of spoken language to printed text using the Dragon Dictation System.

## 2023-11-28 ENCOUNTER — OFFICE (OUTPATIENT)
Dept: URBAN - METROPOLITAN AREA CLINIC 76 | Facility: CLINIC | Age: 33
End: 2023-11-28
Payer: COMMERCIAL

## 2023-11-28 VITALS
HEART RATE: 70 BPM | HEIGHT: 63 IN | WEIGHT: 146 LBS | SYSTOLIC BLOOD PRESSURE: 124 MMHG | DIASTOLIC BLOOD PRESSURE: 72 MMHG

## 2023-11-28 DIAGNOSIS — R19.4 CHANGE IN BOWEL HABIT: ICD-10-CM

## 2023-11-28 DIAGNOSIS — R15.2 FECAL URGENCY: ICD-10-CM

## 2023-11-28 DIAGNOSIS — K58.0 IRRITABLE BOWEL SYNDROME WITH DIARRHEA: ICD-10-CM

## 2023-11-28 PROCEDURE — 99204 OFFICE O/P NEW MOD 45 MIN: CPT | Performed by: INTERNAL MEDICINE

## 2023-12-30 DIAGNOSIS — F41.1 GENERALIZED ANXIETY DISORDER: ICD-10-CM

## 2023-12-30 DIAGNOSIS — F42.2 MIXED OBSESSIONAL THOUGHTS AND ACTS: ICD-10-CM

## 2023-12-30 DIAGNOSIS — F31.4 BIPOLAR DISORDER, CURRENT EPISODE DEPRESSED, SEVERE, WITHOUT PSYCHOTIC FEATURES: ICD-10-CM

## 2024-01-02 RX ORDER — LAMOTRIGINE 100 MG/1
150 TABLET ORAL DAILY
Qty: 135 TABLET | Refills: 0 | OUTPATIENT
Start: 2024-01-02

## 2024-01-02 NOTE — TELEPHONE ENCOUNTER
Patient should not need a refill until february  Called pt and verified that she was not needing a refill currently.

## 2024-01-12 DIAGNOSIS — F31.4 BIPOLAR DISORDER, CURRENT EPISODE DEPRESSED, SEVERE, WITHOUT PSYCHOTIC FEATURES: ICD-10-CM

## 2024-01-12 NOTE — TELEPHONE ENCOUNTER
REFILL REQUEST FOR VRAYLAR 1.5 MG CAPSULES.  Cariprazine HCl (Vraylar) 1.5 MG capsule capsule (11/08/2023)     FOLLOW UP APPT-NONE IN EPIC.  PT LAST SEEN ON 11/08/2023.  PT LEFT WITHOUT BEING SEEN ON APPT 12/14/2023.    PT NEEDS TO SCHEDULE FOLLOW UP.

## 2024-01-15 RX ORDER — CARIPRAZINE 1.5 MG/1
1.5 CAPSULE, GELATIN COATED ORAL DAILY
Qty: 30 CAPSULE | Refills: 0 | Status: SHIPPED | OUTPATIENT
Start: 2024-01-15 | End: 2024-01-18

## 2024-01-18 ENCOUNTER — TELEMEDICINE (OUTPATIENT)
Dept: BEHAVIORAL HEALTH | Facility: CLINIC | Age: 34
End: 2024-01-18
Payer: COMMERCIAL

## 2024-01-18 DIAGNOSIS — F42.2 MIXED OBSESSIONAL THOUGHTS AND ACTS: Primary | ICD-10-CM

## 2024-01-18 DIAGNOSIS — F41.1 GENERALIZED ANXIETY DISORDER: ICD-10-CM

## 2024-01-18 DIAGNOSIS — F31.76 BIPOLAR DISORDER, IN FULL REMISSION, MOST RECENT EPISODE DEPRESSED: ICD-10-CM

## 2024-01-18 RX ORDER — LAMOTRIGINE 100 MG/1
150 TABLET ORAL DAILY
Qty: 135 TABLET | Refills: 0 | Status: SHIPPED | OUTPATIENT
Start: 2024-01-18 | End: 2024-04-17

## 2024-01-18 RX ORDER — QUETIAPINE FUMARATE 50 MG/1
TABLET, EXTENDED RELEASE ORAL
Qty: 60 EACH | Refills: 1 | Status: SHIPPED | OUTPATIENT
Start: 2024-01-18

## 2024-01-18 NOTE — PROGRESS NOTES
This provider is located at [address] [facility] [address], [address], [place] [address]. The Patient is seen remotely using [employer] bookhart. Patient is being seen via telehealth and confirm that they are in a secure environment for this session. The patient's condition being diagnosed/treated is appropriate for telemedicine. The provider identified herself as well as her credentials.   The patient gave consent to be seen remotely, and when consent is given they understand that the consent allows for patient identifiable information to be sent to a third party as needed.   They may refuse to be seen remotely at any time. The electronic data is encrypted and password protected, and the patient has been advised of the potential risks to privacy not withstanding such measures.    Patient is accepting of and agreeable to appointment.  The appointment consisted of the patient and I only.      Mode of visit: Video  Location of provider: [facility] [address], [address], [place] [address].  Location of patient: Home  Does the patient consent to use a video/audio connection for your medical care today? Yes  The visit included audio and video interaction. No technical issues occurred during this visit.    Chief Complaint:  Anxiety     History of Present Illness: Mariam Lopes is a 33 y.o. female who presents today for follow-up of mood. Pt had some weight gain so she stopped vraylar 2 weeks ago, although denies any increased appetite with the medication. Pt denies feeling depressed. Pt denies having any SI or HI. She c/o difficulty concentrating and being easily distracted. Pt reports having executive dysfunction due to compulsions which feels debilitating and leads to anhedonia. Pt has an obsessive need to control everything. Pt reports having a lot of mental rituals. No panic attacks. She also c/o irritability. No symptoms of hypomania or melany.  Patient is very anxious about upcoming dental appointment with possibly  removing to lower teeth.    Medical Record Review: Reviewed office visit note from 11/16/21, pt seen for anxiety/depression, postpartum depression. She has managed her symptoms by smoking marijuana. S/p Zoloft in the past and that put her in the mental hospital. She does have bipolar disorder and in the past she has been prescribed Lamictal and that is the only thing that really seemed to help her.  S/p celexa, buspar, hydroxyzine. PHQ-9 score of 14 at visit. Pt denies having any thoughts of harming her baby.     Reviewed recent lab results from 11/16/21, CBC WNL (except MCV 99.8, immature grans 1.5%, abs immature grans 0.13), CMP WNL (except calcium 8.3), TSH and FT4 WNL    PHQ-9 Depression Screening  Little interest or pleasure in doing things?     Feeling down, depressed, or hopeless?     Trouble falling or staying asleep, or sleeping too much?     Feeling tired or having little energy?     Poor appetite or overeating?     Feeling bad about yourself - or that you are a failure or have let yourself or your family down?     Trouble concentrating on things, such as reading the newspaper or watching television?     Moving or speaking so slowly that other people could have noticed? Or the opposite - being so fidgety or restless that you have been moving around a lot more than usual?     Thoughts that you would be better off dead, or of hurting yourself in some way?     PHQ-9 Total Score     If you checked off any problems, how difficult have these problems made it for you to do your work, take care of things at home, or get along with other people?       OMAIRA-7:           ROS:  Review of Systems   Constitutional:  Negative for appetite change, diaphoresis, fatigue and unexpected weight change.   HENT:  Negative for drooling, tinnitus and trouble swallowing.    Eyes:  Negative for visual disturbance.   Respiratory:  Negative for cough, chest tightness and shortness of breath.    Cardiovascular:  Negative for chest pain,  palpitations and leg swelling.   Gastrointestinal:  Negative for abdominal pain, constipation, diarrhea, nausea and vomiting.   Endocrine: Negative for cold intolerance and heat intolerance.   Genitourinary:  Negative for difficulty urinating.   Musculoskeletal:  Negative for arthralgias, joint swelling and myalgias.   Skin:  Negative for rash.   Allergic/Immunologic: Negative for immunocompromised state.   Neurological:  Negative for dizziness, tremors, seizures, speech difficulty, numbness and headaches.   Psychiatric/Behavioral:  Positive for agitation and decreased concentration. Negative for dysphoric mood, hallucinations, self-injury, sleep disturbance and suicidal ideas. The patient is nervous/anxious.        Problem List:  Patient Active Problem List   Diagnosis    Cervical cancer screening    Gastroesophageal reflux disease    Nausea    Normal pregnancy in multigravida    RhD negative    Type A blood, Rh negative    Underimmunization status       Current Medications:   Current Outpatient Medications   Medication Sig Dispense Refill    lamoTRIgine (LaMICtal) 100 MG tablet Take 1.5 tablets by mouth Daily for 90 days. 135 tablet 0    dicyclomine (BENTYL) 20 MG tablet Take 1 tablet by mouth 4 (Four) Times a Day As Needed for Abdominal Cramping (and IBS-D). 60 tablet 2    norelgestromin-ethinyl estradiol (ORTHO EVRA) 150-35 MCG/24HR Place 1 patch on the skin as directed by provider 1 (One) Time Per Week. 3 patch 12    QUEtiapine fumarate ER (SEROquel XR) 50 MG tablet sustained-release 24 hour tablet Take 1 tab p.o. nightly for 1 week, if tolerated can increase to 2 tab p.o. nightly. 60 each 1    triazolam (HALCION) 0.25 MG tablet TAKE 1 TABLET BY MOUTH BY MOUTH AS DIRECTED AND BRING PRESCRIPTION TO DENTAL APPOINTMENT TO BE GIVEN BY DENTIST       No current facility-administered medications for this visit.       Discontinued Medications:  Medications Discontinued During This Encounter   Medication Reason     "Cariprazine HCl (Vraylar) 1.5 MG capsule capsule     lamoTRIgine (LaMICtal) 100 MG tablet Reorder           Allergy:   Allergies   Allergen Reactions    Erythromycin Rash    Sulfa Antibiotics Rash        Past Medical History:  Past Medical History:   Diagnosis Date    Anxiety     Complication of pregnancy, childbirth and puerperium 2021    - History of broken ribs in prior pregnanany-so far no problems with this one.    Depression     Obsessive-compulsive disorder     Panic disorder     Self-injurious behavior     Substance abuse     Withdrawal symptoms, drug or narcotic        Past Surgical History:  Past Surgical History:   Procedure Laterality Date    COLONOSCOPY      D & C AND LAPAROSCOPY      ENDOSCOPY      TEAR DUCT SURGERY      VAGINAL BIRTH AFTER  SECTION         Past Psychiatric History:  Began Treatment: Patient has been in therapy since seventh grade due to outbursts.   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   Therapist: Pt did therapy from the age in 7th grade until she was 18 years old.   Admission History: Patient was admitted when she was in high school to [facility] due to SI and HI, and increased agitation after being started on Zoloft.  Medications/Treatment: Patient has been on Zoloft (SI, HI, increased agitation), Celexa, BuSpar, Seroquel (weight gain), guanfacine. Pt reports being on Lamictal in the past and worked well with controlling her symptoms. S/p hydroxyzine (increased agitation), abilify, vraylar  Self Harm: History of self-harm with cutting her arms and thighs, which she lasted in the seventh/eighth grade.  Patient does admit to hitting herself out of frustration at times.   Suicide Attempts: Denies    Family Psychiatric History:   Diagnoses: Her mother has history of bipolar disorder, anxiety, depression, OCD.  Her father has " history of OCD.  Her sister has a history of anxiety, depression, OCD.  Her brother has a history of OCD.  Substance use: Her mother has a history of drug abuse, father has history of EtOH abuse.  Suicide Attempts/Completions: Her mother attempted suicide multiple times.  Patient denies any family history of suicide completions.    Family History   Problem Relation Age of Onset    Mental illness Mother     Anxiety disorder Mother     Bipolar disorder Mother     Depression Mother     Drug abuse Mother     OCD Mother     Self-Injurious Behavior  Mother     Suicide Attempts Mother     Hypertension Father     Alcohol abuse Father     OCD Father     Heart attack Paternal Uncle     Heart attack Paternal Grandfather     Anxiety disorder Sister     Depression Sister     OCD Sister     OCD Brother     Anxiety disorder Maternal Uncle     Bipolar disorder Maternal Uncle     Depression Maternal Uncle     Drug abuse Maternal Uncle     Anxiety disorder Maternal Grandmother     Bipolar disorder Maternal Grandmother     Depression Maternal Grandmother     Drug abuse Cousin        Substance Abuse History:   Alcohol use: Occasional  Nicotine: Former  Illicit Drug Use: Patient reports history of methamphetamine use (smoking).  Last meth use was in 2017.  Patient admits to smoking marijuana.  Longest Period Sober: Patient has been sober from methamphetamine and other drug use, except for marijuana, since 2017.  Rehab/AA/NA: Patient reports doing rehab twice.  Last rehab was for 14 months in house treatment program which she completed in 2018.    Social History:  Living Situation: Patient currently lives with her fimelba and 3-month-old daughter.  Her stepchildren will sometimes stay with her.  Her 5-year-old daughter will stay with her for about half of the week.  Marital/Relationship History: Patient has been with marino for 3 years.  Patient states this is a very supportive relationship and denies any kind of abuse.  Children: Patient  "has a 3-month-old daughter and a 5-year-old daughter.  Work History/Occupation: Patient works at [employer] and has been there for 3 years.  Education: Patient completed high school and some college.   History: Denies  Legal: Patient reports she is a convicted felon and has many arrests which have all been drug related.    Social History     Socioeconomic History    Marital status: Single   Tobacco Use    Smoking status: Former     Packs/day: 0.50     Years: 4.00     Additional pack years: 0.00     Total pack years: 2.00     Types: Cigarettes     Quit date: 2023     Years since quittin.9     Passive exposure: Past    Smokeless tobacco: Never   Vaping Use    Vaping Use: Every day    Substances: Nicotine, THC    Devices: Disposable   Substance and Sexual Activity    Alcohol use: Yes     Comment: occ    Drug use: Not Currently     Types: Cocaine(coke), Heroin, LSD, Marijuana, Methamphetamines, Opium, PCP    Sexual activity: Yes       Developmental History:   Place of birth: Patient was born in [place].  Siblings: 1 sister and 1 brother.  Childhood: Her parents have history of drug and alcohol abuse.  Otherwise she denies having any history of abuse or trauma.      Physical Exam:  Physical Exam    Appearance: appears to be of stated age, maintains good eye contact.   Behavior: Appropriate, cooperative. No acute distress.  Motor: No abnormal movements  Speech: Coherent and spontaneous speech. Normal reaction time to questions. No pressured speech.   Mood: \"I'm okay\"  Affect: Pt appears anxious when discussing symptoms. Pt is tearful and appears depressed when discussing OCD  Thought content: Negative suicidal ideations, negative homicidal ideations. Patient denies any obsession, compulsion, or phobia. No evidence of delusions.  Perceptions: Negative auditory hallucinations, negative visual hallucinations. Pt does not appear to be actively responding to internal stimuli.   Thought process: Logical, " goal-directed, coherent, and linear with no evidence of flight of ideas, looseness of associations, thought blocking, circumstantiality.  Insight/Judgement: Fair/fair  Cognition: Alert and oriented to person, place, and date. Memory intact for recent and remote events. Attention and concentration intact.     Vital Signs:   There were no vitals taken for this visit.     Lab Results:   Office Visit on 04/13/2023   Component Date Value Ref Range Status    WBC 04/13/2023 7.84  3.40 - 10.80 10*3/mm3 Final    RBC 04/13/2023 4.73  3.77 - 5.28 10*6/mm3 Final    Hemoglobin 04/13/2023 14.9  12.0 - 15.9 g/dL Final    Hematocrit 04/13/2023 44.8  34.0 - 46.6 % Final    MCV 04/13/2023 94.7  79.0 - 97.0 fL Final    MCH 04/13/2023 31.5  26.6 - 33.0 pg Final    MCHC 04/13/2023 33.3  31.5 - 35.7 g/dL Final    RDW 04/13/2023 12.1 (L)  12.3 - 15.4 % Final    RDW-SD 04/13/2023 41.8  37.0 - 54.0 fl Final    MPV 04/13/2023 10.6  6.0 - 12.0 fL Final    Platelets 04/13/2023 339  140 - 450 10*3/mm3 Final    Neutrophil % 04/13/2023 57.7  42.7 - 76.0 % Final    Lymphocyte % 04/13/2023 31.0  19.6 - 45.3 % Final    Monocyte % 04/13/2023 8.4  5.0 - 12.0 % Final    Eosinophil % 04/13/2023 0.9  0.3 - 6.2 % Final    Basophil % 04/13/2023 0.9  0.0 - 1.5 % Final    Immature Grans % 04/13/2023 1.1 (H)  0.0 - 0.5 % Final    Neutrophils, Absolute 04/13/2023 4.52  1.70 - 7.00 10*3/mm3 Final    Lymphocytes, Absolute 04/13/2023 2.43  0.70 - 3.10 10*3/mm3 Final    Monocytes, Absolute 04/13/2023 0.66  0.10 - 0.90 10*3/mm3 Final    Eosinophils, Absolute 04/13/2023 0.07  0.00 - 0.40 10*3/mm3 Final    Basophils, Absolute 04/13/2023 0.07  0.00 - 0.20 10*3/mm3 Final    Immature Grans, Absolute 04/13/2023 0.09 (H)  0.00 - 0.05 10*3/mm3 Final    nRBC 04/13/2023 0.0  0.0 - 0.2 /100 WBC Final    Glucose 04/13/2023 104 (H)  65 - 99 mg/dL Final    BUN 04/13/2023 17  6 - 20 mg/dL Final    Creatinine 04/13/2023 0.76  0.57 - 1.00 mg/dL Final    Sodium 04/13/2023 138  136 -  145 mmol/L Final    Potassium 04/13/2023 3.7  3.5 - 5.2 mmol/L Final    Chloride 04/13/2023 101  98 - 107 mmol/L Final    CO2 04/13/2023 28.0  22.0 - 29.0 mmol/L Final    Calcium 04/13/2023 9.0  8.6 - 10.5 mg/dL Final    Total Protein 04/13/2023 7.4  6.0 - 8.5 g/dL Final    Albumin 04/13/2023 4.6  3.5 - 5.2 g/dL Final    ALT (SGPT) 04/13/2023 22  1 - 33 U/L Final    AST (SGOT) 04/13/2023 26  1 - 32 U/L Final    Alkaline Phosphatase 04/13/2023 86  39 - 117 U/L Final    Total Bilirubin 04/13/2023 0.5  0.0 - 1.2 mg/dL Final    Globulin 04/13/2023 2.8  gm/dL Final    A/G Ratio 04/13/2023 1.6  g/dL Final    BUN/Creatinine Ratio 04/13/2023 22.4  7.0 - 25.0 Final    Anion Gap 04/13/2023 9.0  5.0 - 15.0 mmol/L Final    eGFR 04/13/2023 108.3  >60.0 mL/min/1.73 Final    Total Cholesterol 04/13/2023 141  0 - 200 mg/dL Final    Triglycerides 04/13/2023 45  0 - 150 mg/dL Final    HDL Cholesterol 04/13/2023 65 (H)  40 - 60 mg/dL Final    LDL Cholesterol  04/13/2023 66  0 - 100 mg/dL Final    VLDL Cholesterol 04/13/2023 10  5 - 40 mg/dL Final    LDL/HDL Ratio 04/13/2023 1.03   Final    TSH 04/13/2023 0.832  0.270 - 4.200 uIU/mL Final    Hepatitis C Ab 04/13/2023 Non-Reactive  Non-Reactive Final    Diagnosis 04/13/2023 Comment   Final    NEGATIVE FOR INTRAEPITHELIAL LESION OR MALIGNANCY.  THIS SPECIMEN WAS RESCREENED AS PART OF OUR  PROGRAM.    Specimen adequacy: 04/13/2023 Comment   Final    Satisfactory for evaluation.  Endocervical and/or squamous metaplastic  cells (endocervical component) are present.    Clinician Provided ICD-10: 04/13/2023 Comment   Final    Z12.4  Z11.51    Performed by: 04/13/2023 Comment   Final    Shanelle Cui, Cytotechnologist ([facility])    QC reviewed by: 04/13/2023 Comment   Final    Brittnee Blanco, Cytotechnologist ([facility])    . 04/13/2023 .   Final    Note: 04/13/2023 Comment   Final    The Pap smear is a screening test designed to aid in the detection of  premalignant and malignant  conditions of the uterine cervix.  It is not a  diagnostic procedure and should not be used as the sole means of detecting  cervical cancer.  Both false-positive and false-negative reports do occur.    Method: 04/13/2023 Comment   Final    This liquid based ThinPrep(R) pap test was screened with the  use of an image guided system.    HPV Aptima 04/13/2023 Negative  Negative Final    This nucleic acid amplification test detects fourteen high-risk  HPV types (16,18,31,33,35,39,45,51,52,56,58,59,66,68) without  differentiation.   Office Visit on 03/23/2023   Component Date Value Ref Range Status    HCG, Urine, QL 03/23/2023 Negative  Negative Final    Lot Number 03/23/2023 [id number]   Final    Internal Positive Control 03/23/2023 Passed  Positive, Passed Final    Internal Negative Control 03/23/2023 Passed  Negative, Passed Final    Expiration Date 03/23/2023 123,123   Final       EKG Results:  No orders to display       Imaging Results:  No Images in the past 120 days found..      Assessment/Plan   Diagnoses and all orders for this visit:    1. Mixed obsessional thoughts and acts (Primary)  -     QUEtiapine fumarate ER (SEROquel XR) 50 MG tablet sustained-release 24 hour tablet; Take 1 tab p.o. nightly for 1 week, if tolerated can increase to 2 tab p.o. nightly.  Dispense: 60 each; Refill: 1  -     lamoTRIgine (LaMICtal) 100 MG tablet; Take 1.5 tablets by mouth Daily for 90 days.  Dispense: 135 tablet; Refill: 0    2. Generalized anxiety disorder  -     QUEtiapine fumarate ER (SEROquel XR) 50 MG tablet sustained-release 24 hour tablet; Take 1 tab p.o. nightly for 1 week, if tolerated can increase to 2 tab p.o. nightly.  Dispense: 60 each; Refill: 1  -     lamoTRIgine (LaMICtal) 100 MG tablet; Take 1.5 tablets by mouth Daily for 90 days.  Dispense: 135 tablet; Refill: 0    3. Bipolar disorder, in full remission, most recent episode depressed  -     QUEtiapine fumarate ER (SEROquel XR) 50 MG tablet sustained-release 24  hour tablet; Take 1 tab p.o. nightly for 1 week, if tolerated can increase to 2 tab p.o. nightly.  Dispense: 60 each; Refill: 1  -     lamoTRIgine (LaMICtal) 100 MG tablet; Take 1.5 tablets by mouth Daily for 90 days.  Dispense: 135 tablet; Refill: 0        Patient screened positive for depression based on a PHQ-9 score of 7 on 11/8/2023. Follow-up recommendations include: Suicide Risk Assessment performed and see assessment below .    Presentation seems most consistent with bipolar disorder and anxiety, OCD.  We will continue Lamictal at current dose for management of bipolar, agitation, anxiety, OCD, and overall mood.  We will discontinue Vraylar as patient has already stopped this.  We will start on Seroquel XR for management of bipolar, OCD, and overall mood.  Consider Topamax if appetite increases with medication.  Patient declines referral for psychotherapy.  Instructed patient to contact the office for any new or worsening symptoms or any other concerns.  Follow-up in 2 weeks.  Addressed all questions and concerns.    Visit Diagnoses:    ICD-10-CM ICD-9-CM   1. Mixed obsessional thoughts and acts  F42.2 300.3   2. Generalized anxiety disorder  F41.1 300.02   3. Bipolar disorder, in full remission, most recent episode depressed  F31.76 296.56           PLAN:  Safety: No acute safety concerns at this time.  Therapy: Patient declines referral  Risk Assessment: Risk of self-harm acutely is moderate.  Risk factors include anxiety disorder, mood disorder, family history of suicide attempts, history of self harm in the past, and recent psychosocial stressors (pandemic). Protective factors include denies access to guns/weapons, no present SI, no history of suicide attempts in the past, minimal AODA, healthcare seeking, future orientation, willingness to engage in care.  Risk of self-harm chronically is also moderate, but could be further elevated in the event of treatment noncompliance and/or AODA.  Labs/Diagnostics  Ordered:   No orders of the defined types were placed in this encounter.    Medications:   New Medications Ordered This Visit   Medications    QUEtiapine fumarate ER (SEROquel XR) 50 MG tablet sustained-release 24 hour tablet     Sig: Take 1 tab p.o. nightly for 1 week, if tolerated can increase to 2 tab p.o. nightly.     Dispense:  60 each     Refill:  1    lamoTRIgine (LaMICtal) 100 MG tablet     Sig: Take 1.5 tablets by mouth Daily for 90 days.     Dispense:  135 tablet     Refill:  0     Discussed all risks, benefits, alternatives, and side effects of Lamictal, including but not limited to rash, rebound depressive or manic symptoms if prompt discontinuation, GI upset, agitation, and sedation. Pt instructed to avoid driving and doing other tasks or actions that require to be alert until knowing how the drug affects them. Pt informed that birth control pills and other hormone-based birth control may not work as well to prevent pregnancy and advised to use some other kind of birth control, such as condoms, while taking this med. Discussed the need for pt to immediately call the office for any new or worsening symptoms, such as rash, worsening depression; feeling nervous or restless; suicidal thoughts or actions; or other changes changes in mood or behavior, and all other concerns. Pt educated on med compliance and the risks of suddenly stopping this medication or missing doses. Pt verbalized understanding and is agreeable to taking Lamictal. Addressed all questions and concerns.     Discussed all risks, benefits, alternatives, and side effects of Quetiapine, including but not limited to GI upset, agitation, dizziness, headache, sexual dysfunction, sedation, weight gain, anticholinergic effects, cataract risk, dyslipidemia, extrapyramidal symptoms (dystonia, drug-induced parkinsonism, akathisia, tardive dyskinesia), lowering of seizure threshold, hematologic abnormalities, hyperglycemia, hypothyroidism, increased  mortality in elderly patients with dementia-related psychosis, neuroleptic malignant syndrome, orthostatic hypotension, falls risk in older adults, prolonged QT interval, and temperature dysregulation. Pt instructed to avoid driving and doing other tasks or actions that require to be alert until knowing how the drug affects them.  Pt educated on the need to practice safe sex while taking this med. Discussed the need for pt to immediately call the office for any new or worsening symptoms, such as worsening depression; feeling nervous or restless; suicidal thoughts or actions; or other changes changes in mood or behavior, and all other concerns. Pt educated on med compliance and the risks of suddenly stopping this medication or missing doses. Pt verbalized understanding and is agreeable to taking Quetiapine. Addressed all questions and concerns.       Follow up:   F/u in 2 weeks    TREATMENT PLAN/GOALS: Continue supportive psychotherapy efforts and medications as indicated. Treatment and medication options discussed during today's visit. Patient ackowledged and verbally consented to continue with current treatment plan and was educated on the importance of compliance with treatment and follow-up appointments.    MEDICATION ISSUES:  WYATT reviewed as expected.  Discussed medication options and treatment plan of prescribed medication as well as the risks, benefits, and side effects including potential falls, possible impaired driving and metabolic adversities among others. Patient is agreeable to call the office with any worsening of symptoms or onset of side effects. Patient is agreeable to call 911 or go to the nearest ER should he/she begin having SI/HI. No medication side effects or related complaints today.        This document has been electronically signed by Melyssa Dang PA-C  January 18, 2024 11:32 EST      Part of this note may be an electronic transcription/translation of spoken language to printed text  using the Dragon Dictation System.

## 2024-01-23 ENCOUNTER — OFFICE VISIT (OUTPATIENT)
Dept: FAMILY MEDICINE CLINIC | Facility: CLINIC | Age: 34
End: 2024-01-23
Payer: COMMERCIAL

## 2024-01-23 VITALS
HEIGHT: 63 IN | BODY MASS INDEX: 25.16 KG/M2 | WEIGHT: 142 LBS | OXYGEN SATURATION: 96 % | HEART RATE: 101 BPM | TEMPERATURE: 97.5 F

## 2024-01-23 DIAGNOSIS — J10.1 INFLUENZA A: ICD-10-CM

## 2024-01-23 DIAGNOSIS — B34.9 VIRAL ILLNESS: Primary | ICD-10-CM

## 2024-01-23 PROBLEM — F32.9 MAJOR DEPRESSIVE DISORDER, SINGLE EPISODE, UNSPECIFIED: Status: ACTIVE | Noted: 2024-01-23

## 2024-01-23 LAB
EXPIRATION DATE: ABNORMAL
FLUAV AG UPPER RESP QL IA.RAPID: DETECTED
FLUBV AG UPPER RESP QL IA.RAPID: NOT DETECTED
INTERNAL CONTROL: ABNORMAL
Lab: ABNORMAL
SARS-COV-2 AG UPPER RESP QL IA.RAPID: NOT DETECTED

## 2024-01-23 PROCEDURE — 87428 SARSCOV & INF VIR A&B AG IA: CPT | Performed by: FAMILY MEDICINE

## 2024-01-23 PROCEDURE — 99213 OFFICE O/P EST LOW 20 MIN: CPT | Performed by: FAMILY MEDICINE

## 2024-01-23 RX ORDER — OSELTAMIVIR PHOSPHATE 75 MG/1
75 CAPSULE ORAL 2 TIMES DAILY
Qty: 10 CAPSULE | Refills: 0 | Status: SHIPPED | OUTPATIENT
Start: 2024-01-23 | End: 2024-01-28

## 2024-01-23 NOTE — PROGRESS NOTES
"Chief Complaint    Cough (Cough, fever, body aches/chills, nasal drainage since yesterday.  Taking Motrin and DayQuil)    Subjective      Mariam Antoineost presents to [facility]    History of Present Illness    1.) ACUTE RESPIRATORY ILLNESS : Onset - last 24 hours. Pt presents with c/o a cough, fevers at home, chills, myalgia and nasal congestion/drainage. No documented hx of lung pathology.    Objective     Vital Signs:     Pulse 101   Temp 97.5 °F (36.4 °C) (Temporal)   Ht 158.8 cm (62.5\")   Wt 64.4 kg (142 lb)   SpO2 96%   BMI 25.56 kg/m²       Physical Exam  Vitals reviewed.   Constitutional:       General: She is not in acute distress.     Appearance: Normal appearance. She is well-developed.   HENT:      Head: Normocephalic and atraumatic.      Right Ear: Hearing and external ear normal.      Left Ear: Hearing and external ear normal.      Nose: Nose normal.   Eyes:      General: Lids are normal.         Right eye: No discharge.         Left eye: No discharge.      Conjunctiva/sclera: Conjunctivae normal.   Pulmonary:      Effort: Pulmonary effort is normal.   Abdominal:      General: There is no distension.   Musculoskeletal:         General: No swelling.      Cervical back: Neck supple.   Skin:     Coloration: Skin is not jaundiced.      Findings: No erythema.   Neurological:      Mental Status: She is alert. Mental status is at baseline.   Psychiatric:         Mood and Affect: Mood and affect normal.         Thought Content: Thought content normal.     Assessment and Plan     Diagnoses and all orders for this visit:    1. Viral illness (Primary)  Comments:  Recommend adequate fluids and rest.  Acetaminophen/NSAID as needed.  Orders:  -     POCT SARS-CoV-2 Antigen EDWIGE + Flu    2. Influenza A  Comments:  Start Tamiflu.  Excuse from work 1/24 through 1/25.  Recommend mask on 1/26/2024 when patient returns to work.    Other orders  -     oseltamivir (Tamiflu) 75 MG capsule; " Take 1 capsule by mouth 2 (Two) Times a Day for 5 days.  Dispense: 10 capsule; Refill: 0    Follow Up     Return if symptoms worsen or fail to improve.    Patient was given instructions and counseling regarding her condition or for health maintenance advice. Please see specific information pulled into the AVS if appropriate.

## 2024-02-22 ENCOUNTER — TELEPHONE (OUTPATIENT)
Dept: PSYCHIATRY | Facility: CLINIC | Age: 34
End: 2024-02-22
Payer: COMMERCIAL

## 2024-02-22 NOTE — TELEPHONE ENCOUNTER
PATIENT WAS REFERRED TO [facility] ON 06/27/2023, CALLED PATIENT TO FOLLOW UP ON REFERRAL ORDER. 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 PATIENT ASKING THAT REFERRAL BE CLOSED AS SHE DON'T HAVE THE TIME. CLOSING OUT REFERRAL.

## 2024-03-25 DIAGNOSIS — F42.2 MIXED OBSESSIONAL THOUGHTS AND ACTS: ICD-10-CM

## 2024-03-25 DIAGNOSIS — F31.76 BIPOLAR DISORDER, IN FULL REMISSION, MOST RECENT EPISODE DEPRESSED: ICD-10-CM

## 2024-03-25 DIAGNOSIS — F41.1 GENERALIZED ANXIETY DISORDER: ICD-10-CM

## 2024-03-25 NOTE — TELEPHONE ENCOUNTER
ATTEMPTED TO CONTACT PT(PATIENT)      NO ANSWER      LEFT VOICEMAIL WITH INSTRUCTIONS TO RETURN CALL TO OFFICE AT [phone]

## 2024-03-25 NOTE — TELEPHONE ENCOUNTER
NEXT VISIT WITH PROVIDER   NONE IN [facility]     LAST SEEN BY PROVIDER   Telemedicine with Melyssa Dang PA-C (01/18/2024)     LAST MED REFILL  QUEtiapine fumarate ER (SEROquel XR) 50 MG tablet sustained-release 24 hour tablet (01/18/2024)   Dispense Quantity: 60 each Refills: 1          Sig: Take 1 tab p.o. nightly for 1 week, if tolerated can increase to 2 tab p.o. nightly

## 2024-03-26 RX ORDER — QUETIAPINE FUMARATE 50 MG/1
TABLET, EXTENDED RELEASE ORAL
Qty: 60 TABLET | Refills: 0 | Status: SHIPPED | OUTPATIENT
Start: 2024-03-26

## 2024-03-26 NOTE — TELEPHONE ENCOUNTER
PT(PATIENT) VERIFIED     NEXT APPT WITH PROVIDER   Appointment with Melyssa Dang PA-C (2024)     PROVIDER PLEASE ADVISE

## 2024-04-30 ENCOUNTER — TELEMEDICINE (OUTPATIENT)
Dept: BEHAVIORAL HEALTH | Facility: CLINIC | Age: 34
End: 2024-04-30
Payer: COMMERCIAL

## 2024-04-30 DIAGNOSIS — F42.2 MIXED OBSESSIONAL THOUGHTS AND ACTS: ICD-10-CM

## 2024-04-30 DIAGNOSIS — F31.76 BIPOLAR DISORDER, IN FULL REMISSION, MOST RECENT EPISODE DEPRESSED: Primary | ICD-10-CM

## 2024-04-30 DIAGNOSIS — F41.1 GENERALIZED ANXIETY DISORDER: ICD-10-CM

## 2024-04-30 PROCEDURE — 1159F MED LIST DOCD IN RCRD: CPT | Performed by: PHYSICIAN ASSISTANT

## 2024-04-30 PROCEDURE — 1160F RVW MEDS BY RX/DR IN RCRD: CPT | Performed by: PHYSICIAN ASSISTANT

## 2024-04-30 PROCEDURE — 99214 OFFICE O/P EST MOD 30 MIN: CPT | Performed by: PHYSICIAN ASSISTANT

## 2024-04-30 RX ORDER — LAMOTRIGINE 100 MG/1
150 TABLET ORAL DAILY
Qty: 135 TABLET | Refills: 0 | Status: SHIPPED | OUTPATIENT
Start: 2024-04-30 | End: 2024-07-29

## 2024-04-30 NOTE — PROGRESS NOTES
"This provider is located at [address], [address], [place] [address]. The Patient is seen remotely using [facility] [employer]. Patient is being seen via telehealth and confirm that they are in a secure environment for this session. The patient's condition being diagnosed/treated is appropriate for telemedicine. The provider identified herself as well as her credentials.   The patient gave consent to be seen remotely, and when consent is given they understand that the consent allows for patient identifiable information to be sent to a third party as needed.   They may refuse to be seen remotely at any time. The electronic data is encrypted and password protected, and the patient has been advised of the potential risks to privacy not withstanding such measures.    Patient is accepting of and agreeable to appointment.  The appointment consisted of the patient and I only.      Mode of visit: Video  Location of provider: [facility] [address], [place] [address].  Location of patient: Home  Does the patient consent to use a video/audio connection for your medical care today? Yes  The visit included audio and video interaction. No technical issues occurred during this visit.    Chief Complaint:  Anxiety     History of Present Illness: Mariam Lopes is a 33 y.o. female who presents today for follow-up of mood. Pt reports feeling too sedated with taking Seroquel XR 100mg. Pt reports no change in mood with Seroquel 50mg. She is only taking Lamictal at this time. Pt feels like her mental health is doing well at this time. Pt is in new relationship, this is healthy and supportive with no abuse or trauma. Pt states she does better when she is not alone and acknowledges feeling codependent. Pt denies feeling depressed. Pt denies having any SI or HI. No difficulty sleeping. Pt reports concentration and being distracted has been manageable and \"hasn't been bad.\" She c/o difficulty concentrating and being " easily distracted. Pt reports having executive dysfunction due to compulsions which feels debilitating and leads to anhedonia. Pt continues to have obsessions and compulsions, has not been debilitating and has been manageable. Pt reports having a lot of mental rituals and routine, no change. No panic attacks. No irritability. Pt states she has had more patience. No symptoms of hypomania or melany.      Medical Record Review: Reviewed office visit note from 11/16/21, pt seen for anxiety/depression, postpartum depression. She has managed her symptoms by smoking marijuana. S/p Zoloft in the past and that put her in the [facility]. She does have bipolar disorder and in the past she has been prescribed Lamictal and that is the only thing that really seemed to help her.  S/p celexa, buspar, hydroxyzine. PHQ-9 score of 14 at visit. Pt denies having any thoughts of harming her baby.     Reviewed recent lab results from 11/16/21, CBC WNL (except MCV 99.8, immature grans 1.5%, abs immature grans 0.13), CMP WNL (except calcium 8.3), TSH and FT4 WNL    PHQ-9 Depression Screening  Little interest or pleasure in doing things? (P) 0-->not at all   Feeling down, depressed, or hopeless?     Trouble falling or staying asleep, or sleeping too much? (P) 0-->not at all   Feeling tired or having little energy? (P) 0-->not at all   Poor appetite or overeating? (P) 0-->not at all   Feeling bad about yourself - or that you are a failure or have let yourself or your family down? (P) 1-->several days   Trouble concentrating on things, such as reading the newspaper or watching television? (P) 0-->not at all   Moving or speaking so slowly that other people could have noticed? Or the opposite - being so fidgety or restless that you have been moving around a lot more than usual? (P) 0-->not at all   Thoughts that you would be better off dead, or of hurting yourself in some way? (P) 0-->not at all   PHQ-9 Total Score     If you checked off any  problems, how difficult have these problems made it for you to do your work, take care of things at home, or get along with other people? (P) not difficult at all     OMAIRA-7:  Over the last 2 weeks, how often have you been bothered by any of the following problems?  Feeling nervous, anxious, or on edge: Several days  Not being able to stop or control worrying: Several days  Worrying too much about different things: Several days  Trouble relaxing: Not at all  Being so restless that it is hard to sit still: Not at all  Becoming easily annoyed or irritable: Not at all  Feeling afraid as if something awful might happen: Not at all  OMAIRA-7 Total Score: 3        ROS:  Review of Systems   Constitutional:  Negative for appetite change, diaphoresis, fatigue and unexpected weight change.   HENT:  Negative for drooling, tinnitus and trouble swallowing.    Eyes:  Negative for visual disturbance.   Respiratory:  Negative for cough, chest tightness and shortness of breath.    Cardiovascular:  Negative for chest pain, palpitations and leg swelling.   Gastrointestinal:  Negative for abdominal pain, constipation, diarrhea, nausea and vomiting.   Endocrine: Negative for cold intolerance and heat intolerance.   Genitourinary:  Negative for difficulty urinating.   Musculoskeletal:  Negative for arthralgias, joint swelling and myalgias.   Skin:  Negative for rash.   Allergic/Immunologic: Negative for immunocompromised state.   Neurological:  Negative for dizziness, tremors, seizures, speech difficulty, numbness and headaches.   Psychiatric/Behavioral:  Negative for agitation, decreased concentration, dysphoric mood, hallucinations, self-injury, sleep disturbance and suicidal ideas. The patient is nervous/anxious.        Problem List:  Patient Active Problem List   Diagnosis    Cervical cancer screening    Gastroesophageal reflux disease    Nausea    Normal pregnancy in multigravida    RhD negative    Type A blood, Rh negative     "Underimmunization status    Major depressive disorder, single episode, unspecified       Current Medications:   Current Outpatient Medications   Medication Sig Dispense Refill    lamoTRIgine (LaMICtal) 100 MG tablet Take 1.5 tablets by mouth Daily for 90 days. 135 tablet 0    dicyclomine (BENTYL) 20 MG tablet Take 1 tablet by mouth 4 (Four) Times a Day As Needed for Abdominal Cramping (and IBS-D). 60 tablet 2    norelgestromin-ethinyl estradiol (ORTHO EVRA) 150-35 MCG/24HR Place 1 patch on the skin as directed by provider 1 (One) Time Per Week. 3 patch 12    triazolam (HALCION) 0.25 MG tablet TAKE 1 TABLET BY MOUTH BY MOUTH AS DIRECTED AND BRING PRESCRIPTION TO DENTAL APPOINTMENT TO BE GIVEN BY DENTIST       No current facility-administered medications for this visit.       Discontinued Medications:  Medications Discontinued During This Encounter   Medication Reason    QUEtiapine fumarate ER (SEROquel XR) 50 MG tablet sustained-release 24 hour tablet     lamoTRIgine (LaMICtal) 100 MG tablet Reorder             Allergy:   Allergies   Allergen Reactions    Erythromycin Rash    Sulfa Antibiotics Rash        Past Medical History:  Past Medical History:   Diagnosis Date    Anxiety     Complication of pregnancy, childbirth and puerperium 2021    - History of broken ribs in prior pregnanany-so far no problems with this one.    Depression     Irritable bowel syndrome 2008    Obsessive-compulsive disorder     Panic disorder     Self-injurious behavior     Substance abuse     Withdrawal symptoms, drug or narcotic        Past Surgical History:  Past Surgical History:   Procedure Laterality Date    COLONOSCOPY      D & C AND LAPAROSCOPY      ENDOSCOPY      TEAR DUCT SURGERY      VAGINAL BIRTH AFTER  SECTION         Past Psychiatric History:  Began Treatment: Patient has been in therapy since seventh grade due to outbursts.   Diagnoses: Bipolar disorder, severe anger problems, anxiety, depression, \"OCD stemming " "from severe perfectionism.\"  Psychiatrist: Patient last saw a psychiatrist at [facility] in 2018.   Therapist: Pt did therapy from the age in 7th grade until she was 18 years old.   Admission History: Patient was admitted when she was in high school to [facility] due to SI and HI, and increased agitation after being started on Zoloft.  Medications/Treatment: Patient has been on Zoloft (SI, HI, increased agitation), Celexa, BuSpar, Seroquel (weight gain), guanfacine. Pt reports being on Lamictal in the past and worked well with controlling her symptoms. S/p hydroxyzine (increased agitation), abilify, vraylar  Self Harm: History of self-harm with cutting her arms and thighs, which she lasted in the seventh/eighth grade.  Patient does admit to hitting herself out of frustration at times.   Suicide Attempts: Denies    Family Psychiatric History:   Diagnoses: Her mother has history of bipolar disorder, anxiety, depression, OCD.  Her father has history of OCD.  Her sister has a history of anxiety, depression, OCD.  Her brother has a history of OCD.  Substance use: Her mother has a history of drug abuse, father has history of EtOH abuse.  Suicide Attempts/Completions: Her mother attempted suicide multiple times.  Patient denies any family history of suicide completions.    Family History   Problem Relation Age of Onset    Mental illness Mother     Anxiety disorder Mother     Bipolar disorder Mother     Depression Mother     Drug abuse Mother     OCD Mother     Self-Injurious Behavior  Mother     Suicide Attempts Mother     Hypertension Father     Alcohol abuse Father     OCD Father     Heart attack Paternal Uncle     Heart attack Paternal Grandfather     Anxiety disorder Sister     Depression Sister     OCD Sister     OCD Brother     Anxiety disorder Maternal Uncle     Bipolar disorder Maternal Uncle     Depression Maternal Uncle     Drug abuse Maternal Uncle     Anxiety disorder Maternal Grandmother     Bipolar disorder " Maternal Grandmother     Depression Maternal Grandmother     Drug abuse Cousin        Substance Abuse History:   Alcohol use: Occasional  Nicotine: Former  Illicit Drug Use: Patient reports history of methamphetamine use (smoking).  Last meth use was in 2017.  Patient admits to smoking marijuana.  Longest Period Sober: Patient has been sober from methamphetamine and other drug use, except for marijuana, since 2017.  Rehab/AA/NA: Patient reports doing rehab twice.  Last rehab was for 14 months in house treatment program which she completed in 2018.    Social History:  Living Situation: Patient currently lives with her fiancé and 3-month-old daughter.  Her stepchildren will sometimes stay with her.  Her 5-year-old daughter will stay with her for about half of the week.  Marital/Relationship History: Patient has been with marino for 3 years.  Patient states this is a very supportive relationship and denies any kind of abuse.  Children: Patient has a 3-month-old daughter and a 5-year-old daughter.  Work History/Occupation: Patient works at [employer] and has been there for 3 years.  Education: Patient completed high school and some college.   History: Denies  Legal: Patient reports she is a convicted felon and has many arrests which have all been drug related.    Social History     Socioeconomic History    Marital status: Single   Tobacco Use    Smoking status: Former     Current packs/day: 0.00     Average packs/day: 0.5 packs/day for 4.0 years (2.0 ttl pk-yrs)     Types: Cigarettes     Start date: 2019     Quit date: 2023     Years since quittin.2     Passive exposure: Past    Smokeless tobacco: Never   Vaping Use    Vaping status: Every Day    Substances: Nicotine, THC    Devices: Disposable   Substance and Sexual Activity    Alcohol use: Yes     Comment: occ    Drug use: Not Currently     Types: Amphetamines, Benzodiazepines, Cocaine(coke), Heroin, Hydrocodone, LSD, Marijuana, Methamphetamines,  "Opium, Oxycodone, PCP    Sexual activity: Yes       Developmental History:   Place of birth: Patient was born in [place].  Siblings: 1 sister and 1 brother.  Childhood: Her parents have history of drug and alcohol abuse.  Otherwise she denies having any history of abuse or trauma.      Physical Exam:  Physical Exam    Appearance: appears to be of stated age, maintains good eye contact.   Behavior: Appropriate, cooperative. No acute distress.  Motor: No abnormal movements  Speech: Coherent and spontaneous speech. Normal reaction time to questions. No pressured speech. Hyperverbal  Mood: \"I'm good\"  Affect: Full range, appropriate, congruent with spontaneous emotional reactivity. Normal intensity. No emotional blunting. Euthymic  Thought content: Negative suicidal ideations, negative homicidal ideations. Patient denies any obsession, compulsion, or phobia. No evidence of delusions.  Perceptions: Negative auditory hallucinations, negative visual hallucinations. Pt does not appear to be actively responding to internal stimuli.   Thought process: Logical, goal-directed, coherent, and linear with no evidence of flight of ideas, looseness of associations, thought blocking. Circumstantiality.  Insight/Judgement: Fair/fair  Cognition: Alert and oriented to person, place, and date. Memory intact for recent and remote events. Attention and concentration intact.     Vital Signs:   There were no vitals taken for this visit.     Lab Results:   Office Visit on 01/23/2024   Component Date Value Ref Range Status    SARS Antigen 01/23/2024 Not Detected  Not Detected, Presumptive Negative Final    Influenza A Antigen EDWIGE 01/23/2024 Detected (A)  Not Detected Final    Influenza B Antigen EDWIGE 01/23/2024 Not Detected  Not Detected Final    Internal Control 01/23/2024 Passed  Passed Final    Lot Number 01/23/2024 [id number]   Final    Expiration Date 01/23/2024 09/14/2024   Final       EKG Results:  No orders to display       Imaging " Results:  No Images in the past 120 days found..      Assessment/Plan   Diagnoses and all orders for this visit:    1. Bipolar disorder, in full remission, most recent episode depressed (Primary)  -     lamoTRIgine (LaMICtal) 100 MG tablet; Take 1.5 tablets by mouth Daily for 90 days.  Dispense: 135 tablet; Refill: 0    2. Generalized anxiety disorder  -     lamoTRIgine (LaMICtal) 100 MG tablet; Take 1.5 tablets by mouth Daily for 90 days.  Dispense: 135 tablet; Refill: 0    3. Mixed obsessional thoughts and acts  -     lamoTRIgine (LaMICtal) 100 MG tablet; Take 1.5 tablets by mouth Daily for 90 days.  Dispense: 135 tablet; Refill: 0          Patient screened positive for depression based on a PHQ-9 score of 7 on 11/8/2023. Follow-up recommendations include: Suicide Risk Assessment performed and see assessment below .    Presentation seems most consistent with bipolar disorder and anxiety, OCD.  We will discontinue Seroquel as patient has already stopped this.  Patient would like to continue with only Lamictal and states she feels like her mood is doing well and declines further medication changes.  We will continue Lamictal for management of bipolar, anxiety, OCD, and overall mood. Instructed patient to contact the office for any new or worsening symptoms or any other concerns.  Follow-up in 2 months.  Addressed all questions and concerns.    Visit Diagnoses:    ICD-10-CM ICD-9-CM   1. Bipolar disorder, in full remission, most recent episode depressed  F31.76 296.56   2. Generalized anxiety disorder  F41.1 300.02   3. Mixed obsessional thoughts and acts  F42.2 300.3         PLAN:  Safety: No acute safety concerns at this time.  Therapy: Patient declines referral  Risk Assessment: Risk of self-harm acutely is moderate.  Risk factors include anxiety disorder, mood disorder, family history of suicide attempts, history of self harm in the past, and recent psychosocial stressors (pandemic). Protective factors include  denies access to guns/weapons, no present SI, no history of suicide attempts in the past, minimal AODA, healthcare seeking, future orientation, willingness to engage in care.  Risk of self-harm chronically is also moderate, but could be further elevated in the event of treatment noncompliance and/or AODA.  Labs/Diagnostics Ordered:   No orders of the defined types were placed in this encounter.    Medications:   New Medications Ordered This Visit   Medications    lamoTRIgine (LaMICtal) 100 MG tablet     Sig: Take 1.5 tablets by mouth Daily for 90 days.     Dispense:  135 tablet     Refill:  0     Discussed all risks, benefits, alternatives, and side effects of Lamictal, including but not limited to rash, rebound depressive or manic symptoms if prompt discontinuation, GI upset, agitation, and sedation. Pt instructed to avoid driving and doing other tasks or actions that require to be alert until knowing how the drug affects them. Pt informed that birth control pills and other hormone-based birth control may not work as well to prevent pregnancy and advised to use some other kind of birth control, such as condoms, while taking this med. Discussed the need for pt to immediately call the office for any new or worsening symptoms, such as rash, worsening depression; feeling nervous or restless; suicidal thoughts or actions; or other changes changes in mood or behavior, and all other concerns. Pt educated on med compliance and the risks of suddenly stopping this medication or missing doses. Pt verbalized understanding and is agreeable to taking Lamictal. Addressed all questions and concerns.       Follow up:   F/u in 2 months    TREATMENT PLAN/GOALS: Continue supportive psychotherapy efforts and medications as indicated. Treatment and medication options discussed during today's visit. Patient ackowledged and verbally consented to continue with current treatment plan and was educated on the importance of compliance with  treatment and follow-up appointments.    MEDICATION ISSUES:  WYATT reviewed as expected.  Discussed medication options and treatment plan of prescribed medication as well as the risks, benefits, and side effects including potential falls, possible impaired driving and metabolic adversities among others. Patient is agreeable to call the office with any worsening of symptoms or onset of side effects. Patient is agreeable to call 911 or go to the nearest ER should he/she begin having SI/HI. No medication side effects or related complaints today.          This document has been electronically signed by Melyssa Dang PA-C  April 30, 2024 13:11 EDT      Part of this note may be an electronic transcription/translation of spoken language to printed text using the Dragon Dictation System.

## 2024-07-08 ENCOUNTER — OFFICE VISIT (OUTPATIENT)
Dept: PODIATRY | Facility: CLINIC | Age: 34
End: 2024-07-08
Payer: COMMERCIAL

## 2024-07-08 VITALS
BODY MASS INDEX: 23.04 KG/M2 | WEIGHT: 130 LBS | TEMPERATURE: 98 F | DIASTOLIC BLOOD PRESSURE: 81 MMHG | OXYGEN SATURATION: 98 % | HEIGHT: 63 IN | HEART RATE: 80 BPM | SYSTOLIC BLOOD PRESSURE: 123 MMHG

## 2024-07-08 DIAGNOSIS — S92.354A CLOSED NONDISPLACED FRACTURE OF FIFTH METATARSAL BONE OF RIGHT FOOT, INITIAL ENCOUNTER: ICD-10-CM

## 2024-07-08 DIAGNOSIS — M79.671 FOOT PAIN, RIGHT: Primary | ICD-10-CM

## 2024-07-08 PROCEDURE — 29405 APPL SHORT LEG CAST: CPT | Performed by: PODIATRIST

## 2024-07-08 PROCEDURE — 1159F MED LIST DOCD IN RCRD: CPT | Performed by: PODIATRIST

## 2024-07-08 PROCEDURE — 99243 OFF/OP CNSLTJ NEW/EST LOW 30: CPT | Performed by: PODIATRIST

## 2024-07-08 PROCEDURE — 1160F RVW MEDS BY RX/DR IN RCRD: CPT | Performed by: PODIATRIST

## 2024-07-08 NOTE — LETTER
July 8, 2024       No Recipients    Patient: Mariam Lopes   YOB: 1990   Date of Visit: 7/8/2024       Dear CRISTELA Coronado:    Thank you for referring Mariam Lopes to me for evaluation. Below are the relevant portions of my assessment and plan of care.    Encounter Diagnosis and Orders:  Diagnoses and all orders for this visit:    1. Foot pain, right (Primary)    2. Closed nondisplaced fracture of fifth metatarsal bone of right foot, initial encounter        If you have questions, please do not hesitate to call me. I look forward to following Mariam along with you.         Sincerely,        Thomas Guillaume DPM        CC:   No Recipients

## 2024-07-08 NOTE — PROGRESS NOTES
[facility] - PODIATRY    Today's Date: 24    Patient Name: Mariam Lopes  MRN: [id number]  CSN: [id number]  PCP: Devorah Cortez APRN  Referring Provider: Dylan Duque, *    SUBJECTIVE     Chief Complaint   Patient presents with    Right Foot - Establish Care, Fracture     Went to  on 7/3/24  Hit foot on coffee table 2 weeks prior to going to    Wearing cam walker   Sharp shooting pain, uses heating pad, no icing      HPI: Mariam Lopes, a 33 y.o.female, presents to clinic.    New, Established, New Problem: New    Location: Right fifth metatarsal shaft    Duration: 2024    Onset: Acute, hit it on a coffee table    Nature: Sore    Aggravating factors:  Patient relates pain is aggravated by shoe gear and ambulation.      Previous Treatment: CAM Walker.  Intermittently takes it off and walks without the cam walker.    Patient denies any fevers, chills, nausea, vomiting, shortness of breath, nor any other constitutional signs nor symptoms.    No other pedal complaints at this time.    Past Medical History:   Diagnosis Date    Anxiety     Complication of pregnancy, childbirth and puerperium 2021    - History of broken ribs in prior pregnanany-so far no problems with this one.    Depression     Foot pain, right     Irritable bowel syndrome 2008    Obsessive-compulsive disorder     Panic disorder     Self-injurious behavior     Substance abuse     Withdrawal symptoms, drug or narcotic      Past Surgical History:   Procedure Laterality Date    COLONOSCOPY      D & C AND LAPAROSCOPY      ENDOSCOPY      TEAR DUCT SURGERY      VAGINAL BIRTH AFTER  SECTION       Family History   Problem Relation Age of Onset    Mental illness Mother     Anxiety disorder Mother     Bipolar disorder Mother     Depression Mother     Drug abuse Mother     OCD Mother     Self-Injurious Behavior  Mother     Suicide Attempts Mother     Hypertension Father     Alcohol abuse Father      OCD Father     Heart attack Paternal Uncle     Heart attack Paternal Grandfather     Anxiety disorder Sister     Depression Sister     OCD Sister     OCD Brother     Anxiety disorder Maternal Uncle     Bipolar disorder Maternal Uncle     Depression Maternal Uncle     Drug abuse Maternal Uncle     Anxiety disorder Maternal Grandmother     Bipolar disorder Maternal Grandmother     Depression Maternal Grandmother     Drug abuse Cousin      Social History     Socioeconomic History    Marital status: Single   Tobacco Use    Smoking status: Former     Current packs/day: 0.00     Average packs/day: 0.5 packs/day for 4.0 years (2.0 ttl pk-yrs)     Types: Cigarettes     Start date: 2019     Quit date: 2023     Years since quittin.4     Passive exposure: Past    Smokeless tobacco: Never   Vaping Use    Vaping status: Every Day    Substances: Nicotine, THC    Devices: Disposable   Substance and Sexual Activity    Alcohol use: Yes     Comment: occ    Drug use: Not Currently     Types: Amphetamines, Benzodiazepines, Cocaine(coke), Heroin, Hydrocodone, LSD, Marijuana, Methamphetamines, Opium, Oxycodone, PCP    Sexual activity: Yes     Allergies   Allergen Reactions    Erythromycin Rash    Sulfa Antibiotics Rash     Current Outpatient Medications   Medication Sig Dispense Refill    lamoTRIgine (LaMICtal) 100 MG tablet Take 1.5 tablets by mouth Daily for 90 days. 135 tablet 0     No current facility-administered medications for this visit.     Review of Systems   Constitutional: Negative.    Musculoskeletal:         Right fifth metatarsal shaft   All other systems reviewed and are negative.      OBJECTIVE     Vitals:    24 1504   BP: 123/81   Pulse: 80   Temp: 98 °F (36.7 °C)   SpO2: 98%       WBC   Date Value Ref Range Status   2023 7.84 3.40 - 10.80 10*3/mm3 Final     RBC   Date Value Ref Range Status   2023 4.73 3.77 - 5.28 10*6/mm3 Final     Hemoglobin   Date Value Ref Range Status   2023  14.9 12.0 - 15.9 g/dL Final     Hematocrit   Date Value Ref Range Status   04/13/2023 44.8 34.0 - 46.6 % Final     MCV   Date Value Ref Range Status   04/13/2023 94.7 79.0 - 97.0 fL Final     MCH   Date Value Ref Range Status   04/13/2023 31.5 26.6 - 33.0 pg Final     MCHC   Date Value Ref Range Status   04/13/2023 33.3 31.5 - 35.7 g/dL Final     RDW   Date Value Ref Range Status   04/13/2023 12.1 (L) 12.3 - 15.4 % Final     RDW-SD   Date Value Ref Range Status   04/13/2023 41.8 37.0 - 54.0 fl Final     MPV   Date Value Ref Range Status   04/13/2023 10.6 6.0 - 12.0 fL Final     Platelets   Date Value Ref Range Status   04/13/2023 339 140 - 450 10*3/mm3 Final     Neutrophil %   Date Value Ref Range Status   04/13/2023 57.7 42.7 - 76.0 % Final     Lymphocyte %   Date Value Ref Range Status   04/13/2023 31.0 19.6 - 45.3 % Final     Monocyte %   Date Value Ref Range Status   04/13/2023 8.4 5.0 - 12.0 % Final     Eosinophil %   Date Value Ref Range Status   04/13/2023 0.9 0.3 - 6.2 % Final     Basophil %   Date Value Ref Range Status   04/13/2023 0.9 0.0 - 1.5 % Final     Immature Grans %   Date Value Ref Range Status   04/13/2023 1.1 (H) 0.0 - 0.5 % Final     Neutrophils, Absolute   Date Value Ref Range Status   04/13/2023 4.52 1.70 - 7.00 10*3/mm3 Final     Lymphocytes, Absolute   Date Value Ref Range Status   04/13/2023 2.43 0.70 - 3.10 10*3/mm3 Final     Monocytes, Absolute   Date Value Ref Range Status   04/13/2023 0.66 0.10 - 0.90 10*3/mm3 Final     Eosinophils, Absolute   Date Value Ref Range Status   04/13/2023 0.07 0.00 - 0.40 10*3/mm3 Final     Basophils, Absolute   Date Value Ref Range Status   04/13/2023 0.07 0.00 - 0.20 10*3/mm3 Final     Immature Grans, Absolute   Date Value Ref Range Status   04/13/2023 0.09 (H) 0.00 - 0.05 10*3/mm3 Final     nRBC   Date Value Ref Range Status   04/13/2023 0.0 0.0 - 0.2 /100 WBC Final         Lab Results   Component Value Date    GLUCOSE 104 (H) 04/13/2023    BUN 17  04/13/2023    CREATININE 0.76 04/13/2023    EGFR 108.3 04/13/2023    BCR 22.4 04/13/2023    K 3.7 04/13/2023    CO2 28.0 04/13/2023    CALCIUM 9.0 04/13/2023    ALBUMIN 4.6 04/13/2023    BILITOT 0.5 04/13/2023    AST 26 04/13/2023    ALT 22 04/13/2023       Patient seen in no apparent distress.      PHYSICAL EXAM:     Foot/Ankle Exam    GENERAL  Appearance:  appears stated age  Orientation:  AAOx3  Affect:  appropriate  Gait:  unimpaired  Assistance:  independent  Right shoe gear: CAM boot  Left shoe gear: casual shoe    VASCULAR     Right Foot Vascularity   Normal vascular exam    Dorsalis pedis:  2+  Posterior tibial:  2+  Skin temperature:  warm  Edema grading:  None  CFT:  < 3 seconds  Pedal hair growth:  Present  Varicosities:  none     Left Foot Vascularity   Normal vascular exam    Dorsalis pedis:  2+  Posterior tibial:  2+  Skin temperature:  warm  Edema grading:  None  CFT:  < 3 seconds  Pedal hair growth:  Present  Varicosities:  none     NEUROLOGIC     Right Foot Neurologic   Normal sensation    Light touch sensation: normal  Vibratory sensation: normal  Hot/Cold sensation: normal  Protective Sensation using [place]-Natty Monofilament:   Sites intact: 10  Sites tested: 10     Left Foot Neurologic   Normal sensation    Light touch sensation: normal  Vibratory sensation: normal  Hot/Cold sensation:  normal  Protective Sensation using [place] Monofilament:   Sites intact: 10  Sites tested: 10    MUSCULOSKELETAL     Right Foot Musculoskeletal   Tenderness:  metatarsal 5      MUSCLE STRENGTH     Right Foot Muscle Strength   Foot dorsiflexion:  4  Foot plantar flexion:  4  Foot inversion:  4  Foot eversion:  4     Left Foot Muscle Strength   Foot dorsiflexion:  4  Foot plantar flexion:  4  Foot inversion:  4  Foot eversion:  4    RANGE OF MOTION     Right Foot Range of Motion   Foot and ankle ROM within normal limits       Left Foot Range of Motion   Foot and ankle ROM within normal limits       DERMATOLOGIC      Right Foot Dermatologic   Skin  Right foot skin is intact.      Left Foot Dermatologic   Skin  Left foot skin is intact.       RADIOLOGY:      XR Foot 3+ View Right    Result Date: 7/3/2024  Narrative: XR FOOT 3+ VW RIGHT Date of Exam: 7/3/2024 6:57 PM EDT Indication: Bruising, swelling, and unbear weight Comparison: None available. Findings: There is an oblique, nondisplaced fracture through the shaft of the mid to distal fifth metatarsal. No additional fractures are identified.     Impression: Impression: Nondisplaced fracture of the fifth metatarsal shaft. Electronically Signed: Batsheva Reederley  7/3/2024 7:29 PM EDT  Workstation ID: [id number]     ASSESSMENT/PLAN     Diagnoses and all orders for this visit:    1. Foot pain, right (Primary)    2. Closed nondisplaced fracture of fifth metatarsal bone of right foot, initial encounter        Comprehensive lower extremity examination and evaluation was performed.    Discussed findings and treatment plan including risks, benefits, and treatment options with patient in detail. Patient agreed with treatment plan.    Medications and allergies reviewed.  Reviewed available lab values along with other pertinent labs.  These were discussed with the patient.    Extremity:  Right leg, Short Leg cast.    Patient was placed in fiberglass cast today. The patient tolerated the procedure without any complications., Neurovascular status was evaluated post cast application and was within normal limits. The cast was not causing impingement on neuro-vasculature or vital structures.    Dr. Guillaume advised patient to not drive any vehicles.  They were advised quick/hard depression of brakes could cause injury to their foot.  Also advised patient of possible legal implications while driving in restrictive device.  The patient states understanding and agreement with these instructions.    Patient instructed use OTC analgesics with dosing per package insert as needed.   Patient states understanding and agreement with this plan.    An After Visit Summary was printed and given to the patient at discharge, including (if requested) any available informative/educational handouts regarding diagnosis, treatment, or medications. All questions were answered to patient/family satisfaction. Should symptoms fail to improve or worsen they agree to call or return to clinic or to go to the Emergency Department. Discussed the importance of following up with any needed screening tests/labs/specialist appointments and any requested follow-up recommended by me today. Importance of maintaining follow-up discussed and patient accepts that missed appointments can delay diagnosis and potentially lead to worsening of conditions.    Return in about 4 weeks (around 8/5/2024) for X-ray needed, Cast removal., or sooner if acute issues arise.    This document has been electronically signed by Thomas Guillaume DPM on July 8, 2024 15:47 EDT

## 2024-07-24 ENCOUNTER — TELEPHONE (OUTPATIENT)
Dept: PODIATRY | Facility: CLINIC | Age: 34
End: 2024-07-24

## 2024-07-24 NOTE — TELEPHONE ENCOUNTER
PATIENT CALLED TO RELAY THAT FMLA PAPERWORK DROPPED OFF YESTERDAY 7-23-24 NEEDS TO BE FAXED TO HER EMPLOYER [employer] @ [phone] & COMPLETED BY TOMORROW 07-25-24 IF POSSIBLE     PLEASE CALL / LEAVE VMAIL PATIENT CELL [phone] WHEN COMPLETED     THANKS

## 2024-07-31 ENCOUNTER — OFFICE VISIT (OUTPATIENT)
Dept: PODIATRY | Facility: CLINIC | Age: 34
End: 2024-07-31
Payer: COMMERCIAL

## 2024-07-31 VITALS
SYSTOLIC BLOOD PRESSURE: 128 MMHG | DIASTOLIC BLOOD PRESSURE: 83 MMHG | HEIGHT: 63 IN | BODY MASS INDEX: 23.4 KG/M2 | TEMPERATURE: 97.6 F | HEART RATE: 113 BPM | OXYGEN SATURATION: 97 %

## 2024-07-31 DIAGNOSIS — M79.671 FOOT PAIN, RIGHT: Primary | ICD-10-CM

## 2024-07-31 DIAGNOSIS — S92.354A CLOSED NONDISPLACED FRACTURE OF FIFTH METATARSAL BONE OF RIGHT FOOT, INITIAL ENCOUNTER: ICD-10-CM

## 2024-07-31 NOTE — PROGRESS NOTES
[facility] - PODIATRY    Today's Date: 24    Patient Name: Mariam Lopes  MRN: [id number]  CSN: [id number]  PCP: Devorah Cortez APRN  Referring Provider: No ref. provider found    SUBJECTIVE     HPI: Mariam Lopes, a 34 y.o.female, presents to clinic.    New, Established, New Problem: est    Location: Right fifth metatarsal shaft    Duration: 2024    Onset: Acute, hit it on a coffee table    Nature: Sore, improving    Aggravating factors:  Patient relates pain is aggravated by shoe gear and ambulation.      Previous Treatment: CAM Walker, fiberglass cast x 4 weeks    Patient denies any fevers, chills, nausea, vomiting, shortness of breath, nor any other constitutional signs nor symptoms.    No other pedal complaints at this time.    Past Medical History:   Diagnosis Date    Anxiety     Complication of pregnancy, childbirth and puerperium 2021    - History of broken ribs in prior pregnanany-so far no problems with this one.    Depression     Foot pain, right     Irritable bowel syndrome 2008    Obsessive-compulsive disorder     Panic disorder     Self-injurious behavior     Substance abuse     Withdrawal symptoms, drug or narcotic      Past Surgical History:   Procedure Laterality Date    COLONOSCOPY      D & C AND LAPAROSCOPY      ENDOSCOPY      TEAR DUCT SURGERY      VAGINAL BIRTH AFTER  SECTION       Family History   Problem Relation Age of Onset    Mental illness Mother     Anxiety disorder Mother     Bipolar disorder Mother     Depression Mother     Drug abuse Mother     OCD Mother     Self-Injurious Behavior  Mother     Suicide Attempts Mother     Hypertension Father     Alcohol abuse Father     OCD Father     Heart attack Paternal Uncle     Heart attack Paternal Grandfather     Anxiety disorder Sister     Depression Sister     OCD Sister     OCD Brother     Anxiety disorder Maternal Uncle     Bipolar disorder Maternal Uncle     Depression Maternal Uncle      Drug abuse Maternal Uncle     Anxiety disorder Maternal Grandmother     Bipolar disorder Maternal Grandmother     Depression Maternal Grandmother     Drug abuse Cousin      Social History     Socioeconomic History    Marital status: Single   Tobacco Use    Smoking status: Former     Current packs/day: 0.00     Average packs/day: 0.5 packs/day for 4.0 years (2.0 ttl pk-yrs)     Types: Cigarettes     Start date: 2019     Quit date: 2023     Years since quittin.4     Passive exposure: Past    Smokeless tobacco: Never   Vaping Use    Vaping status: Every Day    Substances: Nicotine, THC    Devices: Disposable   Substance and Sexual Activity    Alcohol use: Yes     Comment: occ    Drug use: Not Currently     Types: Amphetamines, Benzodiazepines, Cocaine(coke), Heroin, Hydrocodone, LSD, Marijuana, Methamphetamines, Opium, Oxycodone, PCP    Sexual activity: Yes     Allergies   Allergen Reactions    Erythromycin Rash    Sulfa Antibiotics Rash     Current Outpatient Medications   Medication Sig Dispense Refill    lamoTRIgine (LaMICtal) 100 MG tablet Take 1.5 tablets by mouth Daily for 90 days. 135 tablet 0     No current facility-administered medications for this visit.     Review of Systems   Constitutional: Negative.    Musculoskeletal:         Right fifth metatarsal shaft   All other systems reviewed and are negative.      OBJECTIVE     Vitals:    24 1253   BP: 128/83   Pulse: 113   Temp: 97.6 °F (36.4 °C)   SpO2: 97%         WBC   Date Value Ref Range Status   2023 7.84 3.40 - 10.80 10*3/mm3 Final     RBC   Date Value Ref Range Status   2023 4.73 3.77 - 5.28 10*6/mm3 Final     Hemoglobin   Date Value Ref Range Status   2023 14.9 12.0 - 15.9 g/dL Final     Hematocrit   Date Value Ref Range Status   2023 44.8 34.0 - 46.6 % Final     MCV   Date Value Ref Range Status   2023 94.7 79.0 - 97.0 fL Final     MCH   Date Value Ref Range Status   2023 31.5 26.6 - 33.0 pg  Final     MCHC   Date Value Ref Range Status   04/13/2023 33.3 31.5 - 35.7 g/dL Final     RDW   Date Value Ref Range Status   04/13/2023 12.1 (L) 12.3 - 15.4 % Final     RDW-SD   Date Value Ref Range Status   04/13/2023 41.8 37.0 - 54.0 fl Final     MPV   Date Value Ref Range Status   04/13/2023 10.6 6.0 - 12.0 fL Final     Platelets   Date Value Ref Range Status   04/13/2023 339 140 - 450 10*3/mm3 Final     Neutrophil %   Date Value Ref Range Status   04/13/2023 57.7 42.7 - 76.0 % Final     Lymphocyte %   Date Value Ref Range Status   04/13/2023 31.0 19.6 - 45.3 % Final     Monocyte %   Date Value Ref Range Status   04/13/2023 8.4 5.0 - 12.0 % Final     Eosinophil %   Date Value Ref Range Status   04/13/2023 0.9 0.3 - 6.2 % Final     Basophil %   Date Value Ref Range Status   04/13/2023 0.9 0.0 - 1.5 % Final     Immature Grans %   Date Value Ref Range Status   04/13/2023 1.1 (H) 0.0 - 0.5 % Final     Neutrophils, Absolute   Date Value Ref Range Status   04/13/2023 4.52 1.70 - 7.00 10*3/mm3 Final     Lymphocytes, Absolute   Date Value Ref Range Status   04/13/2023 2.43 0.70 - 3.10 10*3/mm3 Final     Monocytes, Absolute   Date Value Ref Range Status   04/13/2023 0.66 0.10 - 0.90 10*3/mm3 Final     Eosinophils, Absolute   Date Value Ref Range Status   04/13/2023 0.07 0.00 - 0.40 10*3/mm3 Final     Basophils, Absolute   Date Value Ref Range Status   04/13/2023 0.07 0.00 - 0.20 10*3/mm3 Final     Immature Grans, Absolute   Date Value Ref Range Status   04/13/2023 0.09 (H) 0.00 - 0.05 10*3/mm3 Final     nRBC   Date Value Ref Range Status   04/13/2023 0.0 0.0 - 0.2 /100 WBC Final         Lab Results   Component Value Date    GLUCOSE 104 (H) 04/13/2023    BUN 17 04/13/2023    CREATININE 0.76 04/13/2023    EGFR 108.3 04/13/2023    BCR 22.4 04/13/2023    K 3.7 04/13/2023    CO2 28.0 04/13/2023    CALCIUM 9.0 04/13/2023    ALBUMIN 4.6 04/13/2023    BILITOT 0.5 04/13/2023    AST 26 04/13/2023    ALT 22 04/13/2023       Patient  seen in no apparent distress.      PHYSICAL EXAM:     Foot/Ankle Exam    GENERAL  Appearance:  appears stated age  Orientation:  AAOx3  Affect:  appropriate  Assistance:  crutches  Right shoe gear: CAM boot (Below-knee fiberglass cast.)  Left shoe gear: casual shoe    VASCULAR     Right Foot Vascularity   Normal vascular exam    Dorsalis pedis:  2+  Posterior tibial:  2+  Skin temperature:  warm  Edema grading:  None  CFT:  < 3 seconds  Pedal hair growth:  Present  Varicosities:  none     Left Foot Vascularity   Normal vascular exam    Dorsalis pedis:  2+  Posterior tibial:  2+  Skin temperature:  warm  Edema grading:  None  CFT:  < 3 seconds  Pedal hair growth:  Present  Varicosities:  none     NEUROLOGIC     Right Foot Neurologic   Normal sensation    Light touch sensation: normal  Vibratory sensation: normal  Hot/Cold sensation: normal  Protective Sensation using [place]-Natty Monofilament:   Sites intact: 10  Sites tested: 10     Left Foot Neurologic   Normal sensation    Light touch sensation: normal  Vibratory sensation: normal  Hot/Cold sensation:  normal  Protective Sensation using [place] Monofilament:   Sites intact: 10  Sites tested: 10    MUSCULOSKELETAL     Right Foot Musculoskeletal   Tenderness:  metatarsal 5   (Minimal TTP)    MUSCLE STRENGTH     Right Foot Muscle Strength   Foot dorsiflexion:  4  Foot plantar flexion:  4  Foot inversion:  4  Foot eversion:  4     Left Foot Muscle Strength   Foot dorsiflexion:  4  Foot plantar flexion:  4  Foot inversion:  4  Foot eversion:  4    RANGE OF MOTION     Right Foot Range of Motion   Foot and ankle ROM within normal limits       Left Foot Range of Motion   Foot and ankle ROM within normal limits      DERMATOLOGIC      Right Foot Dermatologic   Skin  Right foot skin is intact.      Left Foot Dermatologic   Skin  Left foot skin is intact.       RADIOLOGY:      XR Foot 3+ View Right    Result Date: 7/31/2024  Narrative: IN-OFFICE IMAGING:   Standing, weightbearing, 3 view, AP, MO, Lateral, Right foot Indication: Fracture follow-up Findings: Well-healed oblique, nondisplaced fracture through the shaft of the mid to distal fifth metatarsal healing without complications. Comparison: No other changes seen compared to previous views.      ASSESSMENT/PLAN     Diagnoses and all orders for this visit:    1. Foot pain, right (Primary)    2. Closed nondisplaced fracture of fifth metatarsal bone of right foot, initial encounter    Comprehensive lower extremity examination and evaluation was performed.    Discussed findings and treatment plan including risks, benefits, and treatment options with patient in detail. Patient agreed with treatment plan.    Medications and allergies reviewed.  Reviewed available lab values along with other pertinent labs.  These were discussed with the patient.    Patient may begin to weight bear as tolerated in supportive shoes.  No impact activities for one month.  After that time, the patient may increase activities as tolerated.  Patient states understanding and agreement with this plan.    Rice Therapy: It is important to treat any injury as soon as possible to help control swelling and increase recovery time. The recognized regimen for immediate treatment of sport injuries includes rest, ice (cold application), compression, and elevation (RICE). Remove the injured athlete from play, apply ice to the affected area, wrap or compress the injured area with an elastic bandage when appropriate, and elevate the injured area above heart level to reduce swelling.  The patient is to not use ice for longer than 20 minutes at a time, with at least 20 minutes of no ice usage between applications.     Patient instructed use OTC analgesics with dosing per package insert as needed.      The patient states understanding and agreement with this plan.    An After Visit Summary was printed and given to the patient at discharge, including (if  requested) any available informative/educational handouts regarding diagnosis, treatment, or medications. All questions were answered to patient/family satisfaction. Should symptoms fail to improve or worsen they agree to call or return to clinic or to go to the Emergency Department. Discussed the importance of following up with any needed screening tests/labs/specialist appointments and any requested follow-up recommended by me today. Importance of maintaining follow-up discussed and patient accepts that missed appointments can delay diagnosis and potentially lead to worsening of conditions.    Return if symptoms worsen or fail to improve., or sooner if acute issues arise.    This document has been electronically signed by Thomas Guillaume DPM on July 31, 2024 13:15 EDT

## 2024-08-15 ENCOUNTER — TELEPHONE (OUTPATIENT)
Dept: PODIATRY | Facility: CLINIC | Age: 34
End: 2024-08-15

## 2024-08-15 NOTE — TELEPHONE ENCOUNTER
PLEASE SEND PATIENT A MyChart REPLY WITH RETURN TO WORK NOTE ATTACHED     PATIENT REQUESTING RETURN TO WORK NOTE STATING SHE CAN (CONTINUE TO) WORK FULL-TIME WITH NO RESTRICTION EXCEPT THAT PATIENT IS CONTINUING TO WEAR THE HARD SHELL WALKING BOOT WHILE AT WORK STANDING ON HER FEET FOR 7+ HOUR SHIFT     TO BE FAXED TO HER EMPLOYER [employer] @ [phone] BY / BEFORE TOMORROW FRI 08-16-24 (BY / BEFORE PATIENT's NEXT SHIFT THIS SATURDAY 08-17-24     MOST RECENT OFFICE VISIT 07-31-24     NO FOLLOW UP CURRENTLY SCHEDULED     THANKS

## 2025-08-22 ENCOUNTER — TELEMEDICINE (OUTPATIENT)
Dept: BEHAVIORAL HEALTH | Facility: CLINIC | Age: 35
End: 2025-08-22
Payer: COMMERCIAL

## 2025-08-22 DIAGNOSIS — F42.2 MIXED OBSESSIONAL THOUGHTS AND ACTS: ICD-10-CM

## 2025-08-22 DIAGNOSIS — F41.1 GENERALIZED ANXIETY DISORDER: ICD-10-CM

## 2025-08-22 DIAGNOSIS — F31.4 BIPOLAR DISORDER, CURRENT EPISODE DEPRESSED, SEVERE, WITHOUT PSYCHOTIC FEATURES: Primary | ICD-10-CM

## 2025-08-22 DIAGNOSIS — F99 INSOMNIA DUE TO OTHER MENTAL DISORDER: ICD-10-CM

## 2025-08-22 DIAGNOSIS — F51.05 INSOMNIA DUE TO OTHER MENTAL DISORDER: ICD-10-CM

## 2025-08-22 RX ORDER — LAMOTRIGINE 25 MG/1
TABLET ORAL
Qty: 60 TABLET | Refills: 1 | Status: SHIPPED | OUTPATIENT
Start: 2025-08-22